# Patient Record
Sex: MALE | Race: WHITE | Employment: OTHER | URBAN - METROPOLITAN AREA
[De-identification: names, ages, dates, MRNs, and addresses within clinical notes are randomized per-mention and may not be internally consistent; named-entity substitution may affect disease eponyms.]

---

## 2017-06-30 ENCOUNTER — OFFICE VISIT (OUTPATIENT)
Dept: FAMILY MEDICINE | Facility: CLINIC | Age: 62
End: 2017-06-30
Payer: COMMERCIAL

## 2017-06-30 ENCOUNTER — TELEPHONE (OUTPATIENT)
Dept: FAMILY MEDICINE | Facility: CLINIC | Age: 62
End: 2017-06-30

## 2017-06-30 VITALS
BODY MASS INDEX: 32.91 KG/M2 | TEMPERATURE: 98.4 F | HEIGHT: 72 IN | SYSTOLIC BLOOD PRESSURE: 134 MMHG | HEART RATE: 66 BPM | OXYGEN SATURATION: 98 % | RESPIRATION RATE: 18 BRPM | DIASTOLIC BLOOD PRESSURE: 76 MMHG | WEIGHT: 243 LBS

## 2017-06-30 DIAGNOSIS — E11.9 TYPE 2 DIABETES MELLITUS WITHOUT COMPLICATION, WITHOUT LONG-TERM CURRENT USE OF INSULIN (H): Primary | ICD-10-CM

## 2017-06-30 DIAGNOSIS — E66.9 OBESITY (BMI 30-39.9): ICD-10-CM

## 2017-06-30 DIAGNOSIS — I10 ESSENTIAL HYPERTENSION WITH GOAL BLOOD PRESSURE LESS THAN 140/90: ICD-10-CM

## 2017-06-30 DIAGNOSIS — Z13.5 SCREENING FOR DIABETIC RETINOPATHY: ICD-10-CM

## 2017-06-30 DIAGNOSIS — G47.33 OSA (OBSTRUCTIVE SLEEP APNEA): ICD-10-CM

## 2017-06-30 DIAGNOSIS — Z13.6 CARDIOVASCULAR SCREENING; LDL GOAL LESS THAN 70: ICD-10-CM

## 2017-06-30 DIAGNOSIS — Z13.89 SCREENING FOR DIABETIC PERIPHERAL NEUROPATHY: ICD-10-CM

## 2017-06-30 PROCEDURE — 99214 OFFICE O/P EST MOD 30 MIN: CPT | Performed by: FAMILY MEDICINE

## 2017-06-30 PROCEDURE — 99207 C FOOT EXAM  NO CHARGE: CPT | Performed by: FAMILY MEDICINE

## 2017-06-30 RX ORDER — LANCETS
EACH MISCELLANEOUS
Qty: 200 EACH | Refills: 1 | Status: SHIPPED | OUTPATIENT
Start: 2017-06-30 | End: 2021-04-02

## 2017-06-30 RX ORDER — LISINOPRIL 40 MG/1
40 TABLET ORAL DAILY
Qty: 90 TABLET | Refills: 3 | Status: SHIPPED | OUTPATIENT
Start: 2017-06-30 | End: 2018-06-28

## 2017-06-30 RX ORDER — METOPROLOL SUCCINATE 200 MG/1
200 TABLET, EXTENDED RELEASE ORAL DAILY
Qty: 90 TABLET | Refills: 3 | Status: SHIPPED | OUTPATIENT
Start: 2017-06-30 | End: 2018-06-28

## 2017-06-30 RX ORDER — HYDROCHLOROTHIAZIDE 25 MG/1
25 TABLET ORAL EVERY MORNING
Qty: 90 TABLET | Refills: 3 | Status: SHIPPED | OUTPATIENT
Start: 2017-06-30 | End: 2018-06-28

## 2017-06-30 ASSESSMENT — PAIN SCALES - GENERAL: PAINLEVEL: NO PAIN (0)

## 2017-06-30 NOTE — TELEPHONE ENCOUNTER
Breeze 2 test strips not on formulary, can dr change to onetouch ultra blue meter, strips and lancets that are covered on patients insurance? Please advise, thank you.    Keesha Freedman, Pharmacy Technician  [facility]  [phone]

## 2017-06-30 NOTE — PROGRESS NOTES
SUBJECTIVE:                                                    Barbie Portillo is a 61 year old male who presents to clinic today for the following health issues:      Medication Followup / refills     Taking Medication as prescribed: yes    Side Effects:  None    Medication Helping Symptoms:  yes       Problem list and histories reviewed & adjusted, as indicated.  Additional history: as documented    Reviewed and updated as needed this visit by clinical staff  Tobacco  Allergies  Meds  Problems  Med Hx  Surg Hx  Fam Hx  Soc Hx        Reviewed and updated as needed this visit by Provider  Allergies  Meds  Problems         Patient here for medication recheck today.  Diabetes has been well controlled.  He checks his blood sugars a few times a week and it is typically in the 140 range or lower.  His hemoglobin A1c has historically been in low 6 range and he does not require medication for it.      Blood pressure has been at goal.  His is on hydrochlorothiazide and lisinopril and metoprolol for this.     Has obstructive sleep apnea and uses CPAP.  This is well controlled and followed by our sleep center.    Had normal eye exam last fall.    ROS:  10 point ROS of systems including Constitutional, Eyes, Respiratory, Cardiovascular, Gastroenterology, Genitourinary, Integumentary, Muscularskeletal, Psychiatric were all negative except for pertinent positives noted in my HPI.     OBJECTIVE:                                                    /76  Pulse 66  Temp 98.4  F (36.9  C) (Temporal)  Resp 18  Ht 6' (1.829 m)  Wt 243 lb (110.2 kg)  SpO2 98%  BMI 32.96 kg/m2  Body mass index is 32.96 kg/(m^2).  GENERAL APPEARANCE: alert, no distress and obese  RESP: lungs clear to auscultation - no rales, rhonchi or wheezes  CV: regular rates and rhythm, normal S1 S2, no S3 or S4 and no murmur, click or rub  MS: extremities normal- no gross deformities noted.  No lower extremity edema.  DIABETIC FOOT EXAM:  normal DP and PT pulses, no trophic changes or ulcerative lesions and normal sensory exam          ASSESSMENT/PLAN:                                                        ICD-10-CM    1. Type 2 diabetes mellitus without complication, without long-term current use of insulin (H) E11.9 Basic metabolic panel - FUTURE  S+90     Hemoglobin A1c - FUTURE S+90     Lipid panel reflex to direct LDL - FUTURE  S+90     Albumin Random Urine Quantitative - FUTURE  S+90     TSH with free T4 reflex - FUTURE  S+90     STATIN NOT PRESCRIBED, INTENTIONAL,     blood glucose (CHIKI BREEZE 2) test strip   2. Essential hypertension with goal blood pressure less than 140/90 I10 hydrochlorothiazide (HYDRODIURIL) 25 MG tablet     lisinopril (PRINIVIL/ZESTRIL) 40 MG tablet     metoprolol (TOPROL-XL) 200 MG 24 hr tablet     Basic metabolic panel - FUTURE  S+90     Lipid panel reflex to direct LDL - FUTURE  S+90     Albumin Random Urine Quantitative - FUTURE  S+90     TSH with free T4 reflex - FUTURE  S+90   3. Screening for diabetic retinopathy Z13.5 EYE EXAM (SIMPLE-NONBILLABLE)     4. Screening for diabetic peripheral neuropathy Z13.89 FOOT EXAM  NO CHARGE [37679.114]   5. Obesity (BMI 30-39.9) E66.9 TSH with free T4 reflex - FUTURE  S+90   6. SEVERE NASRIN (obstructive sleep apnea) G47.33    7. CARDIOVASCULAR SCREENING; LDL GOAL LESS THAN 70 Z13.6 Lipid panel reflex to direct LDL - FUTURE  S+90     PLAN:  1.   Medications refilled for all other above noted stable conditions.   2.  Monitoring labs as noted above.  3.  Continue management of obstructive sleep apnea.   4.  Weight loss encouraged.  Exercise discussed.    Follow up with Provider - 6-12 months for hypertension and diabetes monitoring.  Sooner if labs out of range.     Aric Stephens MD   [facility]

## 2017-06-30 NOTE — MR AVS SNAPSHOT
After Visit Summary   6/30/2017    Barbie Portillo    MRN: [id number]           Patient Information     Date Of Birth          1955        Visit Information        Provider Department      6/30/2017 2:45 PM Aric Stephens MD [facility]        Today's Diagnoses     Type 2 diabetes mellitus without complication, without long-term current use of insulin (H)    -  1    Essential hypertension with goal blood pressure less than 140/90        Screening for diabetic retinopathy        Screening for diabetic peripheral neuropathy        Obesity (BMI 30-39.9)        SEVERE NASRIN (obstructive sleep apnea)        CARDIOVASCULAR SCREENING; LDL GOAL LESS THAN 70           Follow-ups after your visit        Future tests that were ordered for you today     Open Future Orders        Priority Expected Expires Ordered    Basic metabolic panel - FUTURE  S+90 Routine  9/28/2017 6/30/2017    Hemoglobin A1c - FUTURE S+90 Routine  9/28/2017 6/30/2017    Lipid panel reflex to direct LDL - FUTURE  S+90 Routine  9/28/2017 6/30/2017    Albumin Random Urine Quantitative - FUTURE  S+90 Routine  9/28/2017 6/30/2017    TSH with free T4 reflex - FUTURE  S+90 Routine  9/28/2017 6/30/2017            Who to contact     If you have questions or need follow up information about today's clinic visit or your schedule please contact [facility] directly at [phone].  Normal or non-critical lab and imaging results will be communicated to you by MyChart, letter or phone within 4 business days after the clinic has received the results. If you do not hear from us within 7 days, please contact the clinic through MyChart or phone. If you have a critical or abnormal lab result, we will notify you by phone as soon as possible.  Submit refill requests through [employer] or call your pharmacy and they will forward the refill request to us. Please allow 3 business days for your refill to be completed.        "   Additional Information About Your Visit        MyChart Information     [employer] lets you send messages to your doctor, view your test results, renew your prescriptions, schedule appointments and more. To sign up, go to [email or web address] . Click on \"Log in\" on the left side of the screen, which will take you to the Welcome page. Then click on \"Sign up Now\" on the right side of the page.     You will be asked to enter the access code listed below, as well as some personal information. Please follow the directions to create your username and password.     Your access code is: [email or web address]  Expires: 2017  3:45 PM     Your access code will  in 90 days. If you need help or a new code, please call your [facility] clinic or [phone].        Care EveryWhere ID     This is your Care EveryWhere ID. This could be used by other organizations to access your [facility] medical records  [id number]        Your Vitals Were     Pulse Temperature Respirations Height Pulse Oximetry BMI (Body Mass Index)    66 98.4  F (36.9  C) (Temporal) 18 6' (1.829 m) 98% 32.96 kg/m2       Blood Pressure from Last 3 Encounters:   17 134/76   16 132/80   10/13/15 124/72    Weight from Last 3 Encounters:   17 243 lb (110.2 kg)   16 241 lb (109.3 kg)   10/13/15 247 lb (112 kg)              We Performed the Following     EYE EXAM (SIMPLE-NONBILLABLE)       FOOT EXAM  NO CHARGE [22862.114]          Where to get your medicines      These medications were sent to [facility] [place]  [place], [place] - [address]  [address], [place] [address]     Phone:  [phone]     blood glucose test strip    hydrochlorothiazide 25 MG tablet    lisinopril 40 MG tablet    metoprolol 200 MG 24 hr tablet         Some of these will need a paper prescription and others can be bought over the counter.  Ask your nurse if you have questions.     You don't need a prescription for these medications     STATIN NOT " PRESCRIBED (INTENTIONAL)          Primary Care Provider Office Phone # Fax #    Aric Stephens [phone]       [facility] [place] [address]   [facility] [address]        Equal Access to Services     ALIA ROBLES : Hadyohan keen ku hadcamachoo Soomaali, waaxda luqadaha, qaybta kaalmada adeegyada, waxdonita gamezn drake koch laummventura tay. So [facility] [phone].    ATENCIÓN: Si habla español, tiene a hernandez disposición servicios gratuitos de asistencia lingüística. Ll[facility] [phone].    We comply with applicable federal civil rights laws and [place] laws. We do not discriminate on the basis of race, color, national origin, age, disability sex, sexual orientation or gender identity.            Thank you!     Thank you for choosing [facility]  for your care. Our goal is always to provide you with excellent care. Hearing back from our patients is one way we can continue to improve our services. Please take a few minutes to complete the written survey that you may receive in the mail after your visit with us. Thank you!             Your Updated Medication List - Protect others around you: Learn how to safely use, store and throw away your medicines at www.disposemymeds.org.          This list is accurate as of: 6/30/17  3:45 PM.  Always use your most recent med list.                   Brand Name Dispense Instructions for use Diagnosis    aspirin 81 MG tablet     30 tablet    Take 1 tablet (81 mg) by mouth daily    Type 2 diabetes, HbA1c goal < 7% (H), Hypertension goal BP (blood pressure) < 140/90       blood glucose monitoring lancets     1 Box    Use to test blood sugar 3 times daily or as directed.    Type II or unspecified type diabetes mellitus without mention of complication, not stated as uncontrolled       blood glucose test strip    CHIKI BREEZE 2    100 each    Use to test blood sugar 3 times daily or as directed.    Type 2 diabetes mellitus without complication,  without long-term current use of insulin (H)       DAILY MULTIVITAMIN PO      Take 1 tablet by mouth daily.        hydrochlorothiazide 25 MG tablet    HYDRODIURIL    90 tablet    Take 1 tablet (25 mg) by mouth every morning    Essential hypertension with goal blood pressure less than 140/90       lisinopril 40 MG tablet    PRINIVIL/ZESTRIL    90 tablet    Take 1 tablet (40 mg) by mouth daily    Essential hypertension with goal blood pressure less than 140/90       metoprolol 200 MG 24 hr tablet    TOPROL-XL    90 tablet    Take 1 tablet (200 mg) by mouth daily    Essential hypertension with goal blood pressure less than 140/90       STATIN NOT PRESCRIBED (INTENTIONAL)      Statin not prescribed intentionally due to Other: not indicated due to extremely low LDL.    Type 2 diabetes mellitus without complication, without long-term current use of insulin (H)

## 2017-06-30 NOTE — TELEPHONE ENCOUNTER
Sent generic lancets and test strips so pharmacy can fill whatever insurance will cover.  Closing this encounter.  Aym Gonsalves RN

## 2017-06-30 NOTE — NURSING NOTE
Chief Complaint   Patient presents with     Recheck Medication     refills        Initial /76  Pulse 66  Temp 98.4  F (36.9  C) (Temporal)  Resp 18  Ht 6' (1.829 m)  Wt 243 lb (110.2 kg)  SpO2 98%  BMI 32.96 kg/m2 Estimated body mass index is 32.96 kg/(m^2) as calculated from the following:    Height as of this encounter: 6' (1.829 m).    Weight as of this encounter: 243 lb (110.2 kg).  Medication Reconciliation: complete

## 2017-07-08 DIAGNOSIS — Z13.6 CARDIOVASCULAR SCREENING; LDL GOAL LESS THAN 70: ICD-10-CM

## 2017-07-08 DIAGNOSIS — I10 ESSENTIAL HYPERTENSION WITH GOAL BLOOD PRESSURE LESS THAN 140/90: ICD-10-CM

## 2017-07-08 DIAGNOSIS — E66.9 OBESITY (BMI 30-39.9): ICD-10-CM

## 2017-07-08 DIAGNOSIS — E11.9 TYPE 2 DIABETES MELLITUS WITHOUT COMPLICATION, WITHOUT LONG-TERM CURRENT USE OF INSULIN (H): ICD-10-CM

## 2017-07-08 LAB
ANION GAP SERPL CALCULATED.3IONS-SCNC: 9 MMOL/L (ref 3–14)
BUN SERPL-MCNC: 12 MG/DL (ref 7–30)
CALCIUM SERPL-MCNC: 8.4 MG/DL (ref 8.5–10.1)
CHLORIDE SERPL-SCNC: 109 MMOL/L (ref 94–109)
CHOLEST SERPL-MCNC: 119 MG/DL
CO2 SERPL-SCNC: 27 MMOL/L (ref 20–32)
CREAT SERPL-MCNC: 0.76 MG/DL (ref 0.66–1.25)
CREAT UR-MCNC: 283 MG/DL
GFR SERPL CREATININE-BSD FRML MDRD: ABNORMAL ML/MIN/1.7M2
GLUCOSE SERPL-MCNC: 112 MG/DL (ref 70–99)
HBA1C MFR BLD: 6 % (ref 4.3–6)
HDLC SERPL-MCNC: 47 MG/DL
LDLC SERPL CALC-MCNC: 47 MG/DL
MICROALBUMIN UR-MCNC: 28 MG/L
MICROALBUMIN/CREAT UR: 9.89 MG/G CR (ref 0–17)
NONHDLC SERPL-MCNC: 72 MG/DL
POTASSIUM SERPL-SCNC: 3.7 MMOL/L (ref 3.4–5.3)
SODIUM SERPL-SCNC: 145 MMOL/L (ref 133–144)
TRIGL SERPL-MCNC: 126 MG/DL
TSH SERPL DL<=0.005 MIU/L-ACNC: 1.38 MU/L (ref 0.4–4)

## 2017-07-08 PROCEDURE — 84443 ASSAY THYROID STIM HORMONE: CPT | Performed by: FAMILY MEDICINE

## 2017-07-08 PROCEDURE — 82043 UR ALBUMIN QUANTITATIVE: CPT | Performed by: FAMILY MEDICINE

## 2017-07-08 PROCEDURE — 36415 COLL VENOUS BLD VENIPUNCTURE: CPT | Performed by: FAMILY MEDICINE

## 2017-07-08 PROCEDURE — 80061 LIPID PANEL: CPT | Performed by: FAMILY MEDICINE

## 2017-07-08 PROCEDURE — 80048 BASIC METABOLIC PNL TOTAL CA: CPT | Performed by: FAMILY MEDICINE

## 2017-07-08 PROCEDURE — 83036 HEMOGLOBIN GLYCOSYLATED A1C: CPT | Mod: QW | Performed by: FAMILY MEDICINE

## 2018-06-28 ENCOUNTER — OFFICE VISIT (OUTPATIENT)
Dept: FAMILY MEDICINE | Facility: CLINIC | Age: 63
End: 2018-06-28
Payer: COMMERCIAL

## 2018-06-28 VITALS
DIASTOLIC BLOOD PRESSURE: 74 MMHG | SYSTOLIC BLOOD PRESSURE: 136 MMHG | HEART RATE: 71 BPM | WEIGHT: 255.2 LBS | RESPIRATION RATE: 16 BRPM | BODY MASS INDEX: 34.57 KG/M2 | OXYGEN SATURATION: 95 % | TEMPERATURE: 98.5 F | HEIGHT: 72 IN

## 2018-06-28 DIAGNOSIS — Z12.5 SCREENING PSA (PROSTATE SPECIFIC ANTIGEN): ICD-10-CM

## 2018-06-28 DIAGNOSIS — Z12.11 SCREEN FOR COLON CANCER: ICD-10-CM

## 2018-06-28 DIAGNOSIS — Z23 NEED FOR PROPHYLACTIC VACCINATION WITH TETANUS-DIPHTHERIA (TD): ICD-10-CM

## 2018-06-28 DIAGNOSIS — G47.33 OSA (OBSTRUCTIVE SLEEP APNEA): ICD-10-CM

## 2018-06-28 DIAGNOSIS — E78.5 HYPERLIPIDEMIA LDL GOAL <70: ICD-10-CM

## 2018-06-28 DIAGNOSIS — L30.9 DERMATITIS: ICD-10-CM

## 2018-06-28 DIAGNOSIS — E11.9 TYPE 2 DIABETES MELLITUS WITHOUT COMPLICATION, WITHOUT LONG-TERM CURRENT USE OF INSULIN (H): Primary | ICD-10-CM

## 2018-06-28 DIAGNOSIS — I10 ESSENTIAL HYPERTENSION WITH GOAL BLOOD PRESSURE LESS THAN 140/90: ICD-10-CM

## 2018-06-28 DIAGNOSIS — Z23 ENCOUNTER FOR IMMUNIZATION: ICD-10-CM

## 2018-06-28 DIAGNOSIS — E66.9 OBESITY (BMI 30-39.9): ICD-10-CM

## 2018-06-28 DIAGNOSIS — Z13.89 SCREENING FOR DIABETIC PERIPHERAL NEUROPATHY: ICD-10-CM

## 2018-06-28 PROCEDURE — 99207 C FOOT EXAM  NO CHARGE: CPT | Performed by: FAMILY MEDICINE

## 2018-06-28 PROCEDURE — 90715 TDAP VACCINE 7 YRS/> IM: CPT | Performed by: FAMILY MEDICINE

## 2018-06-28 PROCEDURE — 90471 IMMUNIZATION ADMIN: CPT | Performed by: FAMILY MEDICINE

## 2018-06-28 PROCEDURE — 90472 IMMUNIZATION ADMIN EACH ADD: CPT | Performed by: FAMILY MEDICINE

## 2018-06-28 PROCEDURE — 99214 OFFICE O/P EST MOD 30 MIN: CPT | Mod: 25 | Performed by: FAMILY MEDICINE

## 2018-06-28 PROCEDURE — 90750 HZV VACC RECOMBINANT IM: CPT | Performed by: FAMILY MEDICINE

## 2018-06-28 RX ORDER — METOPROLOL SUCCINATE 200 MG/1
200 TABLET, EXTENDED RELEASE ORAL DAILY
Qty: 90 TABLET | Refills: 3 | Status: SHIPPED | OUTPATIENT
Start: 2018-06-28 | End: 2019-07-01

## 2018-06-28 RX ORDER — LISINOPRIL 40 MG/1
40 TABLET ORAL DAILY
Qty: 90 TABLET | Refills: 3 | Status: SHIPPED | OUTPATIENT
Start: 2018-06-28 | End: 2019-07-01

## 2018-06-28 RX ORDER — HYDROCHLOROTHIAZIDE 25 MG/1
25 TABLET ORAL EVERY MORNING
Qty: 90 TABLET | Refills: 3 | Status: SHIPPED | OUTPATIENT
Start: 2018-06-28 | End: 2019-07-01

## 2018-06-28 ASSESSMENT — PAIN SCALES - GENERAL: PAINLEVEL: NO PAIN (0)

## 2018-06-28 NOTE — MR AVS SNAPSHOT
After Visit Summary   6/28/2018    Barbie Portillo    MRN: [id number]           Patient Information     Date Of Birth          1955        Visit Information        Provider Department      6/28/2018 1:30 PM Aric Stephens MD [facility]        Today's Diagnoses     Type 2 diabetes mellitus without complication, without long-term current use of insulin (H)    -  1    Essential hypertension with goal blood pressure less than 140/90        Dermatitis        Screen for colon cancer        Screening for diabetic peripheral neuropathy        Need for prophylactic vaccination with tetanus-diphtheria (TD)        SEVERE NASRIN (obstructive sleep apnea)        Hyperlipidemia LDL goal <70        Obesity (BMI 30-39.9)        Screening PSA (prostate specific antigen)          Care Instructions    Try 1% hydrocortisone cream twice daily for a couple of weeks.  If this is not effective after 1-2 weeks or if at any points it worsens, start using Lotrimin (clotrimazole) over the counter antifungal cream twice daily.      Use both creams together if the 1% hydrocortisone cream is partially effective but not fully effective.      You may also try the clotrimazole cream first.            Follow-ups after your visit        Additional Services     GASTROENTEROLOGY ADULT REF PROCEDURE ONLY [facility] [phone]; [facility] Provider       Last Lab Result: Creatinine (mg/dL)       Date                     Value                 07/08/2017               0.76             ----------  Body mass index is 34.61 kg/(m^2).      Patient will be contacted to schedule procedure.     Please be aware that coverage of these services is subject to the terms and limitations of your health insurance plan.  Call member services at your health plan with any benefit or coverage questions.  Any procedures must be performed at a [facility] facility OR coordinated by your clinic's referral office.    Please bring the  following with you to your appointment:    (1) Any X-Rays, CTs or MRIs which have been performed.  Contact the facility where they were done to arrange for  prior to your scheduled appointment.    (2) List of current medications   (3) This referral request   (4) Any documents/labs given to you for this referral                  Future tests that were ordered for you today     Open Future Orders        Priority Expected Expires Ordered    PSA, screen Routine  12/28/2018 6/28/2018    FOOT EXAM  NO CHARGE [41459.114] Routine  12/28/2018 6/28/2018    Hemoglobin A1c - FUTURE S+90 Routine  12/28/2018 6/28/2018    Lipid panel reflex to direct LDL Fasting Routine  12/28/2018 6/28/2018    Albumin Random Urine Quantitative with Creat Ratio Routine  12/28/2018 6/28/2018    Basic metabolic panel Routine  12/28/2018 6/28/2018            Who to contact     If you have questions or need follow up information about today's clinic visit or your schedule please contact [facility] directly at [phone].  Normal or non-critical lab and imaging results will be communicated to you by MyChart, letter or phone within 4 business days after the clinic has received the results. If you do not hear from us within 7 days, please contact the clinic through MyChart or phone. If you have a critical or abnormal lab result, we will notify you by phone as soon as possible.  Submit refill requests through [employer] or call your pharmacy and they will forward the refill request to us. Please allow 3 business days for your refill to be completed.          Additional Information About Your Visit        Care EveryWhere ID     This is your Care EveryWhere ID. This could be used by other organizations to access your [facility] medical records  [id number]        Your Vitals Were     Pulse Temperature Respirations Pulse Oximetry BMI (Body Mass Index)       71 98.5  F (36.9  C) (Temporal) 16 95% 34.61 kg/m2        Blood Pressure from  Last 3 Encounters:   06/28/18 150/80   06/30/17 134/76   06/24/16 132/80    Weight from Last 3 Encounters:   06/28/18 255 lb 3.2 oz (115.8 kg)   06/30/17 243 lb (110.2 kg)   06/24/16 241 lb (109.3 kg)              We Performed the Following     GASTROENTEROLOGY ADULT REF PROCEDURE ONLY [facility] [phone]; [facility] Provider          Where to get your medicines      These medications were sent to [facility], [place] - [address] [place] [address]     Phone:  [phone]     blood glucose monitoring lancets    blood glucose test strip    hydrochlorothiazide 25 MG tablet    lisinopril 40 MG tablet    metoprolol succinate 200 MG 24 hr tablet          Primary Care Provider Office Phone # Fax #    Aric Dylan Stephens [phone]       [address]   [place] [place] [address]        Equal Access to Services     ARTUR ROBLES AH: Hadii aad ku hadasho Soomaali, waaxda luqadaha, qaybta kaalmada adeegyada, waxay idiin hayaan drake carrion . So [facility] [phone].    ATENCIÓN: Si habla español, tiene a hernandez disposición servicios gratuitos de asistencia lingüística. Llame al [phone].    We comply with applicable federal civil rights laws and [place] laws. We do not discriminate on the basis of race, color, national origin, age, disability, sex, sexual orientation, or gender identity.            Thank you!     Thank you for choosing [facility]  for your care. Our goal is always to provide you with excellent care. Hearing back from our patients is one way we can continue to improve our services. Please take a few minutes to complete the written survey that you may receive in the mail after your visit with us. Thank you!             Your Updated Medication List - Protect others around you: Learn how to safely use, store and throw away your medicines at www.disposemymeds.org.          This list is accurate as of 6/28/18  2:43 PM.   Always use your most recent med list.                   Brand Name Dispense Instructions for use Diagnosis    aspirin 81 MG tablet     30 tablet    Take 1 tablet (81 mg) by mouth daily    Type 2 diabetes, HbA1c goal < 7% (H), Hypertension goal BP (blood pressure) < 140/90       * blood glucose monitoring test strip    no brand specified    100 strip    Use to test blood sugar 3 times daily or as directed. To accompany: Blood Glucose Monitor Brands: per insurance.    Type 2 diabetes mellitus without complication, without long-term current use of insulin (H)       * blood glucose test strip    LiveyearbookEZE 2    100 each    Use to test blood sugar 3 times daily or as directed.    Type 2 diabetes mellitus without complication, without long-term current use of insulin (H)       DAILY MULTIVITAMIN PO      Take 1 tablet by mouth daily.        hydrochlorothiazide 25 MG tablet    HYDRODIURIL    90 tablet    Take 1 tablet (25 mg) by mouth every morning    Essential hypertension with goal blood pressure less than 140/90       lisinopril 40 MG tablet    PRINIVIL/ZESTRIL    90 tablet    Take 1 tablet (40 mg) by mouth daily    Essential hypertension with goal blood pressure less than 140/90       metoprolol succinate 200 MG 24 hr tablet    TOPROL-XL    90 tablet    Take 1 tablet (200 mg) by mouth daily    Essential hypertension with goal blood pressure less than 140/90       PREBIOTIC PRODUCT PO           PROBIOTIC ACIDOPHILUS PO           STATIN NOT PRESCRIBED (INTENTIONAL)      Statin not prescribed intentionally due to Other: not indicated due to extremely low LDL.    Type 2 diabetes mellitus without complication, without long-term current use of insulin (H)       * thin lancets    NO BRAND SPECIFIED    200 each    Use to test blood sugar 3 times daily or as directed. To accompany: Blood Glucose Monitor Brands: per insurance.    Type 2 diabetes mellitus without complication, without long-term current use of insulin (H)       *  blood glucose monitoring lancets     1 each    Use to test blood sugar 3 times daily or as directed.    Type 2 diabetes mellitus without complication, without long-term current use of insulin (H)       * Notice:  This list has 4 medication(s) that are the same as other medications prescribed for you. Read the directions carefully, and ask your doctor or other care provider to review them with you.

## 2018-06-28 NOTE — PROGRESS NOTES
SUBJECTIVE:   Barbie Portillo is a 62 year old male who presents to clinic today for the following health issues:      Diabetes Follow-up    Patient is checking blood sugars: once daily.  Results are as follows:         am - 100-130    Diabetic concerns: None     Symptoms of hypoglycemia (low blood sugar): none     Paresthesias (numbness or burning in feet) or sores: No     Date of last diabetic eye exam: 4/2018    BP Readings from Last 2 Encounters:   06/28/18 136/74   06/30/17 134/76     Hemoglobin A1C (%)   Date Value   07/08/2017 6.0   06/27/2016 6.1 (H)     LDL Cholesterol Calculated (mg/dL)   Date Value   07/08/2017 47   06/27/2016 49       Diabetes Management Resources  Hypertension Follow-up      Outpatient blood pressures are not being checked.    Low Salt Diet: low salt      Amount of exercise or physical activity: None    Problems taking medications regularly: No    Medication side effects: none    Diet: low salt          Problem list and histories reviewed & adjusted, as indicated.  Additional history: as documented      Reviewed and updated as needed this visit by clinical staff  Tobacco  Allergies  Meds  Problems  Med Hx  Surg Hx  Fam Hx  Soc Hx        Reviewed and updated as needed this visit by Provider  Allergies  Meds  Problems         Patient here for follow-up of his diabetes and hypertension.  These are stable and he is not having any medication issues.  He checks his blood sugars as noted above.  Patient does not require statin medication due to his extremely low LDL.    Patient has a issue with his left foot and ankle which he is seeing a outside podiatrist.  He says that surgery is very likely but he does not know when in the next couple months this will occur.    Patient is due for routine colonoscopy.    Patient also would like evaluation of a bilateral axillary rash.  It is mildly pruritic.    Patient has obstructive sleep apnea that is being managed by the sleep  clinic    ROS:  10 point ROS of systems including Constitutional, Eyes, HENT, Respiratory, Cardiovascular, Gastroenterology, Genitourinary, Integumentary, Muscularskeletal, Psychiatric were all negative except for pertinent positives noted in my HPI.     OBJECTIVE:   /74  Pulse 71  Temp 98.5  F (36.9  C) (Temporal)  Resp 16  Ht 6' (1.829 m)  Wt 255 lb 3.2 oz (115.8 kg)  SpO2 95%  BMI 34.61 kg/m2  Body mass index is 34.61 kg/(m^2).  Physical Exam   Constitutional: He appears well-developed and well-nourished.   Cardiovascular: Normal rate, regular rhythm, S1 normal, S2 normal and normal heart sounds.    No murmur heard.  Pulmonary/Chest: Effort normal and breath sounds normal. No respiratory distress. He has no wheezes. He has no rhonchi. He has no rales.   Musculoskeletal: He exhibits no edema.   FEET:  monofilament exam normal bilaterally.  Good hair growth.  Dorsalis pedis pulses 2+ bilaterally.  Feet warm.    Feet:   Right Foot:   Skin Integrity: Negative for ulcer, blister, skin breakdown or erythema.   Left Foot:   Skin Integrity: Negative for ulcer, blister, skin breakdown or erythema.   Neurological: He is alert.   Skin:   Bilateral axillary areas have well-demarcated erythematous macules that are uniform in color without a raised border.       ASSESSMENT/PLAN:       ICD-10-CM    1. Type 2 diabetes mellitus without complication, without long-term current use of insulin (H) E11.9 Hemoglobin A1c - FUTURE S+90     Lipid panel reflex to direct LDL Fasting     Albumin Random Urine Quantitative with Creat Ratio     blood glucose (CHIKI BREEZE 2) test strip     blood glucose monitoring (CHIKI MICROLET) lancets     Basic metabolic panel   2. Essential hypertension with goal blood pressure less than 140/90 I10 hydrochlorothiazide (HYDRODIURIL) 25 MG tablet     lisinopril (PRINIVIL/ZESTRIL) 40 MG tablet     metoprolol succinate (TOPROL-XL) 200 MG 24 hr tablet   3. Dermatitis L30.9    4. Screen for colon  cancer Z12.11 GASTROENTEROLOGY ADULT REF PROCEDURE ONLY [facility] [phone]; [place] Provider   5. Screening for diabetic peripheral neuropathy Z13.89 FOOT EXAM  NO CHARGE [[id number]]   6. Need for prophylactic vaccination with tetanus-diphtheria (TD) Z23    7. SEVERE NASRIN (obstructive sleep apnea) G47.33    8. Hyperlipidemia LDL goal <70 E78.5    9. Obesity (BMI 30-39.9) E66.9    10. Screening PSA (prostate specific antigen) Z12.5 PSA, screen   11. Encounter for immunization Z23 ZOSTER VACCINE RECOMBINANT ADJUVANTED IM NJX     ADMIN 1st VACCINE     TDAP, IM (10 - 64 YRS) - Adacel     EA ADD'L VACCINE     PLAN:  1.    See below for over the counter medication recommendations regarding management of the axillary rash.  Rash is either fungal in origin or a dermatitis secondary to sweat and/or antiperspirant/deodorant use.  2.  Medications refilled for all other above noted stable conditions.  Labs ordered as noted above.     Patient Instructions   Try 1% hydrocortisone cream twice daily for a couple of weeks.  If this is not effective after 1-2 weeks or if at any points it worsens, start using Lotrimin (clotrimazole) over the counter antifungal cream twice daily.      Use both creams together if the 1% hydrocortisone cream is partially effective but not fully effective.      You may also try the clotrimazole cream first.           Follow up with Provider -6-12 months for diabetes recheck.    Aric Stephens MD   [facility]

## 2018-06-29 ENCOUNTER — TELEPHONE (OUTPATIENT)
Dept: FAMILY MEDICINE | Facility: CLINIC | Age: 63
End: 2018-06-29

## 2018-06-29 NOTE — TELEPHONE ENCOUNTER
Called to schedule colonoscopy, patient states he is not ready to schedule will call when he is ready

## 2018-06-30 DIAGNOSIS — Z12.5 SCREENING PSA (PROSTATE SPECIFIC ANTIGEN): ICD-10-CM

## 2018-06-30 DIAGNOSIS — E11.9 TYPE 2 DIABETES MELLITUS WITHOUT COMPLICATION, WITHOUT LONG-TERM CURRENT USE OF INSULIN (H): ICD-10-CM

## 2018-06-30 LAB
ANION GAP SERPL CALCULATED.3IONS-SCNC: 9 MMOL/L (ref 3–14)
BUN SERPL-MCNC: 12 MG/DL (ref 7–30)
CALCIUM SERPL-MCNC: 8.4 MG/DL (ref 8.5–10.1)
CHLORIDE SERPL-SCNC: 109 MMOL/L (ref 94–109)
CHOLEST SERPL-MCNC: 106 MG/DL
CO2 SERPL-SCNC: 25 MMOL/L (ref 20–32)
CREAT SERPL-MCNC: 0.79 MG/DL (ref 0.66–1.25)
CREAT UR-MCNC: 142 MG/DL
GFR SERPL CREATININE-BSD FRML MDRD: >90 ML/MIN/1.7M2
GLUCOSE SERPL-MCNC: 121 MG/DL (ref 70–99)
HBA1C MFR BLD: 6.2 % (ref 0–5.6)
HDLC SERPL-MCNC: 42 MG/DL
LDLC SERPL CALC-MCNC: 34 MG/DL
MICROALBUMIN UR-MCNC: 13 MG/L
MICROALBUMIN/CREAT UR: 8.87 MG/G CR (ref 0–17)
NONHDLC SERPL-MCNC: 64 MG/DL
POTASSIUM SERPL-SCNC: 3.8 MMOL/L (ref 3.4–5.3)
PSA SERPL-ACNC: 0.76 UG/L (ref 0–4)
SODIUM SERPL-SCNC: 143 MMOL/L (ref 133–144)
TRIGL SERPL-MCNC: 148 MG/DL

## 2018-06-30 PROCEDURE — 83036 HEMOGLOBIN GLYCOSYLATED A1C: CPT | Mod: QW | Performed by: FAMILY MEDICINE

## 2018-06-30 PROCEDURE — 36415 COLL VENOUS BLD VENIPUNCTURE: CPT | Performed by: FAMILY MEDICINE

## 2018-06-30 PROCEDURE — 80061 LIPID PANEL: CPT | Performed by: FAMILY MEDICINE

## 2018-06-30 PROCEDURE — 82043 UR ALBUMIN QUANTITATIVE: CPT | Performed by: FAMILY MEDICINE

## 2018-06-30 PROCEDURE — 80048 BASIC METABOLIC PNL TOTAL CA: CPT | Performed by: FAMILY MEDICINE

## 2018-06-30 PROCEDURE — G0103 PSA SCREENING: HCPCS | Performed by: FAMILY MEDICINE

## 2018-07-02 ENCOUNTER — TELEPHONE (OUTPATIENT)
Dept: FAMILY MEDICINE | Facility: CLINIC | Age: 63
End: 2018-07-02

## 2018-07-02 NOTE — TELEPHONE ENCOUNTER
----- Message from Aric Stephens MD sent at 7/2/2018  7:40 AM CDT -----  Please notify patient of results.  They all look good.    Aric Stephens MD

## 2018-12-18 ENCOUNTER — OFFICE VISIT (OUTPATIENT)
Dept: FAMILY MEDICINE | Facility: CLINIC | Age: 63
End: 2018-12-18
Payer: COMMERCIAL

## 2018-12-18 VITALS
DIASTOLIC BLOOD PRESSURE: 72 MMHG | HEIGHT: 72 IN | OXYGEN SATURATION: 98 % | RESPIRATION RATE: 20 BRPM | BODY MASS INDEX: 35.76 KG/M2 | HEART RATE: 76 BPM | TEMPERATURE: 98.1 F | SYSTOLIC BLOOD PRESSURE: 134 MMHG | WEIGHT: 264 LBS

## 2018-12-18 DIAGNOSIS — M21.962 LEFT ANKLE JOINT DEFORMITY: ICD-10-CM

## 2018-12-18 DIAGNOSIS — E66.01 SEVERE OBESITY (BMI 35.0-39.9) WITH COMORBIDITY (H): ICD-10-CM

## 2018-12-18 DIAGNOSIS — Z01.818 PREOP GENERAL PHYSICAL EXAM: Primary | ICD-10-CM

## 2018-12-18 DIAGNOSIS — Z12.11 SCREEN FOR COLON CANCER: ICD-10-CM

## 2018-12-18 DIAGNOSIS — G47.33 OSA (OBSTRUCTIVE SLEEP APNEA): ICD-10-CM

## 2018-12-18 DIAGNOSIS — E11.9 TYPE 2 DIABETES MELLITUS WITHOUT COMPLICATION, WITHOUT LONG-TERM CURRENT USE OF INSULIN (H): ICD-10-CM

## 2018-12-18 DIAGNOSIS — I10 ESSENTIAL HYPERTENSION WITH GOAL BLOOD PRESSURE LESS THAN 140/90: ICD-10-CM

## 2018-12-18 DIAGNOSIS — Z23 NEED FOR VACCINATION: ICD-10-CM

## 2018-12-18 LAB
ANION GAP SERPL CALCULATED.3IONS-SCNC: 6 MMOL/L (ref 3–14)
BUN SERPL-MCNC: 14 MG/DL (ref 7–30)
CALCIUM SERPL-MCNC: 8.6 MG/DL (ref 8.5–10.1)
CHLORIDE SERPL-SCNC: 104 MMOL/L (ref 94–109)
CO2 SERPL-SCNC: 29 MMOL/L (ref 20–32)
CREAT SERPL-MCNC: 0.84 MG/DL (ref 0.66–1.25)
GFR SERPL CREATININE-BSD FRML MDRD: >90 ML/MIN/1.7M2
GLUCOSE SERPL-MCNC: 129 MG/DL (ref 70–99)
POTASSIUM SERPL-SCNC: 3.6 MMOL/L (ref 3.4–5.3)
SODIUM SERPL-SCNC: 139 MMOL/L (ref 133–144)

## 2018-12-18 PROCEDURE — 99215 OFFICE O/P EST HI 40 MIN: CPT | Mod: 25 | Performed by: FAMILY MEDICINE

## 2018-12-18 PROCEDURE — 80048 BASIC METABOLIC PNL TOTAL CA: CPT | Performed by: FAMILY MEDICINE

## 2018-12-18 PROCEDURE — 90471 IMMUNIZATION ADMIN: CPT | Performed by: FAMILY MEDICINE

## 2018-12-18 PROCEDURE — 36415 COLL VENOUS BLD VENIPUNCTURE: CPT | Performed by: FAMILY MEDICINE

## 2018-12-18 PROCEDURE — 90750 HZV VACC RECOMBINANT IM: CPT | Performed by: FAMILY MEDICINE

## 2018-12-18 PROCEDURE — 93000 ELECTROCARDIOGRAM COMPLETE: CPT | Performed by: FAMILY MEDICINE

## 2018-12-18 ASSESSMENT — PAIN SCALES - GENERAL: PAINLEVEL: SEVERE PAIN (6)

## 2018-12-18 ASSESSMENT — MIFFLIN-ST. JEOR: SCORE: 2030.5

## 2018-12-18 NOTE — PROGRESS NOTES
[address] [address]  [phone]  Dept: [phone]    PRE-OP EVALUATION:  Today's date: 2018    Barbie Portillo (: 1955) presents for pre-operative evaluation assessment as requested by Dr. Parada .  He requires evaluation and anesthesia risk assessment prior to undergoing surgery/procedure for treatment of left ankle pain.  .    Proposed Surgery/ Procedure: Left ankle rebuild   Date of Surgery/ Procedure: 2019  Time of Surgery/ Procedure: 5:30am   Hospital/Surgical Facility: [facility]   Fax number for surgical facility: [phone]  Primary Physician: Aric Stephens  Type of Anesthesia Anticipated: General    Patient has a Health Care Directive or Living Will:  NO    1. NO - Do you have a history of heart attack, stroke, stent, bypass or surgery on an artery in the head, neck, heart or legs?  2. NO - Do you ever have any pain or discomfort in your chest?  3. NO - Do you have a history of  Heart Failure?  4. NO - Are you troubled by shortness of breath when: walking on the level, up a slight hill or at night?  5. NO - Do you currently have a cold, bronchitis or other respiratory infection?  6. NO - Do you have a cough, shortness of breath or wheezing?  7. NO - Do you sometimes get pains in the calves of your legs when you walk?  8. NO - Do you or anyone in your family have previous history of blood clots?  9. NO - Do you or does anyone in your family have a serious bleeding problem such as prolonged bleeding following surgeries or cuts?  10. NO - Have you ever had problems with anemia or been told to take iron pills?  11. NO - Have you had any abnormal blood loss such as black, tarry or bloody stools, or abnormal vaginal bleeding?  12. NO - Have you ever had a blood transfusion?  13. NO - Have you or any of your relatives ever had problems with anesthesia?  14. YES  - Do you have sleep apnea, excessive snoring or daytime drowsiness? C-Pap    15. NO - Do you have any prosthetic heart valves?  16. Yes - Do you have prosthetic joints? Screws and wires in right ankle.   17. NO - Is there any chance that you may be pregnant?      HPI:     HPI related to upcoming procedure: recommended to have his left ankle repaired due to severe joint reconstruction.        See problem list for active medical problems.  Problems all longstanding and stable, except as noted/documented.  See ROS for pertinent symptoms related to these conditions.                                                                                                                                                          .    MEDICAL HISTORY:     Patient Active Problem List    Diagnosis Date Noted     Severe obesity (BMI 35.0-39.9) with comorbidity (H) 12/18/2018     Priority: Medium     Type 2 diabetes mellitus without complication, without long-term current use of insulin (H) 07/14/2015     Priority: Medium     SEVERE NASRIN (obstructive sleep apnea) 06/04/2013     Priority: Medium     [place]  5/29/2013  Severe AHI 97.5  Oxygen Andrzej 84%  CPAP 8cmH2O       Advanced directives, counseling/discussion 08/27/2012     Priority: Medium     Discussed advance care planning with patient; information given to patient to review. 8/27/2012          Essential hypertension with goal blood pressure less than 140/90 04/18/2011     Priority: Medium      Past Medical History:   Diagnosis Date     Essential hypertension, benign 2006    Hypertension, Benign     Obesity, unspecified      Old disruption of anterior cruciate ligament     ACL right knee      Other and unspecified disc disorder of unspecified region     Intervertebral disc disorders     Past Surgical History:   Procedure Laterality Date     ANKLE SURGERY  2011    right      COLONOSCOPY  10/22/2007     SURGICAL HISTORY OF -   04/04/84    Bilateral testicular biopsy     Current Outpatient Medications   Medication Sig Dispense Refill     aspirin 81 MG  tablet Take 1 tablet (81 mg) by mouth daily 30 tablet      blood glucose (CHIKI BREEZE 2) test strip Use to test blood sugar 3 times daily or as directed. 100 each 12     blood glucose monitoring (CHIKI MICROLET) lancets Use to test blood sugar 3 times daily or as directed. 1 each 11     blood glucose monitoring (NO BRAND SPECIFIED) test strip Use to test blood sugar 3 times daily or as directed. To accompany: Blood Glucose Monitor Brands: per insurance. 100 strip 6     hydrochlorothiazide (HYDRODIURIL) 25 MG tablet Take 1 tablet (25 mg) by mouth every morning 90 tablet 3     Lactobacillus (PROBIOTIC ACIDOPHILUS PO)        lisinopril (PRINIVIL/ZESTRIL) 40 MG tablet Take 1 tablet (40 mg) by mouth daily 90 tablet 3     metoprolol succinate (TOPROL-XL) 200 MG 24 hr tablet Take 1 tablet (200 mg) by mouth daily 90 tablet 3     Multiple Vitamin (DAILY MULTIVITAMIN PO) Take 1 tablet by mouth daily.       PREBIOTIC PRODUCT PO        STATIN NOT PRESCRIBED, INTENTIONAL, Statin not prescribed intentionally due to Other: not indicated due to extremely low LDL.  0     thin (NO BRAND SPECIFIED) lancets Use to test blood sugar 3 times daily or as directed. To accompany: Blood Glucose Monitor Brands: per insurance. 200 each 1     OTC products: None, except as noted above    Allergies   Allergen Reactions     No Known Drug Allergies       Latex Allergy: NO    Social History     Tobacco Use     Smoking status: Never Smoker     Smokeless tobacco: Never Used   Substance Use Topics     Alcohol use: No     History   Drug Use No       REVIEW OF SYSTEMS:   Constitutional, neuro, ENT, endocrine, pulmonary, cardiac, gastrointestinal, genitourinary, musculoskeletal, integument and psychiatric systems are negative, except as otherwise noted.    EXAM:   /72   Pulse 76   Temp 98.1  F (36.7  C) (Temporal)   Resp 20   Ht 1.829 m (6')   Wt 119.7 kg (264 lb)   SpO2 98%   BMI 35.80 kg/m      GENERAL APPEARANCE: healthy, alert and no  distress     EYES: EOMI,  PERRL     HENT: ear canals and TM's normal and nose and mouth without ulcers or lesions     NECK: no adenopathy, no asymmetry, masses, or scars and thyroid normal to palpation     RESP: lungs clear to auscultation - no rales, rhonchi or wheezes     CV: regular rates and rhythm, normal S1 S2, no S3 or S4 and no murmur, click or rub     ABDOMEN:  soft, nontender, no HSM or masses and bowel sounds normal     MS: extremities normal- no gross deformities noted, no evidence of inflammation in joints, FROM in all extremities.     SKIN: no suspicious lesions or rashes     NEURO: Normal strength and tone, sensory exam grossly normal, mentation intact and speech normal     PSYCH: mentation appears normal. and affect normal/bright     LYMPHATICS: No cervical adenopathy    DIAGNOSTICS:   EKG: appears normal, NSR, normal axis, normal intervals, no acute ST/T changes c/w ischemia, no LVH by voltage criteria, unchanged from previous tracings     Recent Labs   Lab Test 06/30/18  0756 07/08/17  0758    145*   POTASSIUM 3.8 3.7   CR 0.79 0.76   A1C 6.2* 6.0        IMPRESSION:   Reason for surgery/procedure:    Left ankle joint deformity    Diagnosis/reason for consult:   Severe obesity (BMI 35.0-39.9) with comorbidity (H)  Type 2 diabetes mellitus without complication, without long-term current use of insulin (H)  NASRIN (obstructive sleep apnea)  Essential hypertension with goal blood pressure less than 140/90      The proposed surgical procedure is considered INTERMEDIATE risk.    REVISED CARDIAC RISK INDEX  The patient has the following serious cardiovascular risks for perioperative complications such as (MI, PE, VFib and 3  AV Block):  No serious cardiac risks  INTERPRETATION: 0 risks: Class I (very low risk - 0.4% complication rate)    The patient has the following additional risks for perioperative complications:  No identified additional risks      ICD-10-CM    1. Preop general physical exam  Z01.818 Random Glucose     Hemoglobin A1c     EKG 12-lead complete w/read - Clinics     Basic metabolic panel   2. Left ankle joint deformity M21.962    3. Severe obesity (BMI 35.0-39.9) with comorbidity (H) E66.01    4. Type 2 diabetes mellitus without complication, without long-term current use of insulin (H) E11.9 Random Glucose     Hemoglobin A1c     Basic metabolic panel   5. SEVERE NASRIN (obstructive sleep apnea) G47.33    6. Essential hypertension with goal blood pressure less than 140/90 I10        RECOMMENDATIONS:       Obstructive Sleep Apnea (or suspected sleep apnea)  Patient is clearly advised to use their home CPAP when released from surgery      --Patient is to take all scheduled medications on the day of surgery EXCEPT for modifications listed below.    Anticoagulant or Antiplatelet Medication Use  ASPIRIN: Discontinue ASA 7-10 days prior to procedure to reduce bleeding risk.  It should be resumed post-operatively.        ACE Inhibitor or Angiotensin Receptor Blocker (ARB) Use  Ace inhibitor or Angiotensin Receptor Blocker (ARB) and should HOLD this medication for the 24 hours prior to surgery.    He will hold hydrochlorothiazide as well while fasting.      APPROVAL GIVEN to proceed with proposed procedure, without further diagnostic evaluation       Signed Electronically by: Aric Stephens MD    Copy of this evaluation report is provided to requesting physician.    [facility] Preop Guidelines    Revised Cardiac Risk Index

## 2018-12-18 NOTE — PROGRESS NOTES
Prior to injection, verified patient identity using patient's name and date of birth.  Due to injection administration, patient instructed to remain in clinic for 15 minutes  afterwards, and to report any adverse reaction to me immediately.    shingrix     Drug Amount Wasted:  None.  Vial/Syringe: Single dose vial  Amy Manning MA

## 2018-12-19 ENCOUNTER — TELEPHONE (OUTPATIENT)
Dept: FAMILY MEDICINE | Facility: CLINIC | Age: 63
End: 2018-12-19

## 2018-12-19 NOTE — LETTER
[address] [address]  [phone]        December 20, 2018    aBrbie Portillo  [address]  [facility] [address]

## 2018-12-19 NOTE — LETTER

## 2018-12-19 NOTE — TELEPHONE ENCOUNTER
Left message for patient to return call to schedule EGD/colonoscopy. If Amarilys or Merline are not available, please transfer to same day surgery

## 2018-12-20 ENCOUNTER — TELEPHONE (OUTPATIENT)
Dept: FAMILY MEDICINE | Facility: CLINIC | Age: 63
End: 2018-12-20

## 2018-12-20 NOTE — TELEPHONE ENCOUNTER
Cell phone went straight to  so called home phone.  LM asking patient to return our call.  Please inform him of the message below.  Jerry Lisa CMA

## 2018-12-20 NOTE — TELEPHONE ENCOUNTER
Date of colonoscopy/EGD: 1/15/19  Surgeon: Dr. Garcia  Prep:Miralax  Packet:Colonoscopy/EGD instructions mailed to patient's home address.   Date: 12/20/2018      Surgery Scheduler

## 2018-12-20 NOTE — TELEPHONE ENCOUNTER
----- Message from Aric Stephens MD sent at 12/20/2018  5:14 PM CST -----  Please notify patient of normal result.     Aric Stephens MD

## 2019-01-14 NOTE — H&P
[facility] History and Physical    Barbie Portillo MRN# [id number]   Age: 63 year old YOB: 1955     Date of Admission:  (Not on file)    Home clinic: [facility]  Primary care provider: Aric Stephens          Impression and Plan:   Impression:   screening  Last colonoscopy 2007      Plan:   Proceed to Colonoscopy as planned.  The procedure, risks(bleeding, perforation), benefits and alternatives were discussed and the patient agrees to proceed. Cleared for Anesthesia             Chief Complaint:   screening    History is obtained from the patient          History of Present Illness:   This 63 year old male is being seen at this time for evaluation of a colonoscopy.  Due for repeat.  No complaints.         Past Medical History:   Diagnosis Date     Essential hypertension, benign 2006    Hypertension, Benign     Obesity, unspecified      Old disruption of anterior cruciate ligament     ACL right knee      Other and unspecified disc disorder of unspecified region     Intervertebral disc disorders            Past Surgical History:     Past Surgical History:   Procedure Laterality Date     ANKLE SURGERY  2011    right      COLONOSCOPY  10/22/2007     SURGICAL HISTORY OF -   04/04/84    Bilateral testicular biopsy            Social History:     Social History     Tobacco Use     Smoking status: Never Smoker     Smokeless tobacco: Never Used   Substance Use Topics     Alcohol use: No            Family History:     Family History   Problem Relation Age of Onset     C.A.D. Father         snores     Diabetes Brother         x2     Hypertension Mother      Arthritis Mother      Prostate Cancer Maternal Grandfather             Immunizations:     VACCINE/DOSE   Diptheria   DPT   DTAP   HBIG   Hepatitis A   Hepatitis B   HIB   Influenza   Measles   Meningococcal   MMR   Mumps   Pneumococcal   Polio   Rubella   Small Pox   TDAP   Varicella   Zoster            Allergies:     Allergies    Allergen Reactions     No Known Drug Allergies             Medications:     No current facility-administered medications for this encounter.      Current Outpatient Medications   Medication Sig     aspirin 81 MG tablet Take 1 tablet (81 mg) by mouth daily     hydrochlorothiazide (HYDRODIURIL) 25 MG tablet Take 1 tablet (25 mg) by mouth every morning     Lactobacillus (PROBIOTIC ACIDOPHILUS PO)      lisinopril (PRINIVIL/ZESTRIL) 40 MG tablet Take 1 tablet (40 mg) by mouth daily     metoprolol succinate (TOPROL-XL) 200 MG 24 hr tablet Take 1 tablet (200 mg) by mouth daily     Multiple Vitamin (DAILY MULTIVITAMIN PO) Take 1 tablet by mouth daily.     PREBIOTIC PRODUCT PO      blood glucose (CHIKI BREEZE 2) test strip Use to test blood sugar 3 times daily or as directed.     blood glucose monitoring (CHIKI MICROLET) lancets Use to test blood sugar 3 times daily or as directed.     blood glucose monitoring (NO BRAND SPECIFIED) test strip Use to test blood sugar 3 times daily or as directed. To accompany: Blood Glucose Monitor Brands: per insurance.     STATIN NOT PRESCRIBED, INTENTIONAL, Statin not prescribed intentionally due to Other: not indicated due to extremely low LDL.     thin (NO BRAND SPECIFIED) lancets Use to test blood sugar 3 times daily or as directed. To accompany: Blood Glucose Monitor Brands: per insurance.             Review of Systems:   The review of systems was positive for the following findings.  None.  The remainder of the review of systems was unremarkable.          Physical Exam:   All vitals have been reviewed  There were no vitals taken for this visit.  No intake or output data in the 24 hours ending 01/14/19 1525  SHEENT examination revealed NC/AT, EOMI, (-)icterus  Examination of the chest revealed CTA  Examination of the heart revealed S1, S2, (-)m//g  Examination of the abdomen revealed soft, non tender, non distended  The neuromuscular examination was WNL          Data:   All laboratory  data reviewed  Results for orders placed or performed in visit on 12/18/18   Basic metabolic panel   Result Value Ref Range    Sodium 139 133 - 144 mmol/L    Potassium 3.6 3.4 - 5.3 mmol/L    Chloride 104 94 - 109 mmol/L    Carbon Dioxide 29 20 - 32 mmol/L    Anion Gap 6 3 - 14 mmol/L    Glucose 129 (H) 70 - 99 mg/dL    Urea Nitrogen 14 7 - 30 mg/dL    Creatinine 0.84 0.66 - 1.25 mg/dL    GFR Estimate >90 >60 mL/min/1.7m2    GFR Estimate If Black >90 >60 mL/min/1.7m2    Calcium 8.6 8.5 - 10.1 mg/dL        Yusef Garcia MD, FACS

## 2019-01-15 ENCOUNTER — ANESTHESIA (OUTPATIENT)
Dept: GASTROENTEROLOGY | Facility: CLINIC | Age: 64
End: 2019-01-15
Payer: COMMERCIAL

## 2019-01-15 ENCOUNTER — HOSPITAL ENCOUNTER (OUTPATIENT)
Facility: CLINIC | Age: 64
Discharge: HOME | End: 2019-01-15
Attending: SPECIALIST | Admitting: SPECIALIST
Payer: COMMERCIAL

## 2019-01-15 ENCOUNTER — ANESTHESIA EVENT (OUTPATIENT)
Dept: GASTROENTEROLOGY | Facility: CLINIC | Age: 64
End: 2019-01-15
Payer: COMMERCIAL

## 2019-01-15 VITALS
HEART RATE: 52 BPM | SYSTOLIC BLOOD PRESSURE: 147 MMHG | RESPIRATION RATE: 20 BRPM | OXYGEN SATURATION: 98 % | DIASTOLIC BLOOD PRESSURE: 85 MMHG | TEMPERATURE: 98 F

## 2019-01-15 LAB — COLONOSCOPY: NORMAL

## 2019-01-15 PROCEDURE — G0121 COLON CA SCRN NOT HI RSK IND: HCPCS | Performed by: SPECIALIST

## 2019-01-15 PROCEDURE — 25000125 ZZHC RX 250: Performed by: SPECIALIST

## 2019-01-15 PROCEDURE — 45378 DIAGNOSTIC COLONOSCOPY: CPT | Performed by: SPECIALIST

## 2019-01-15 PROCEDURE — 25000125 ZZHC RX 250: Performed by: NURSE ANESTHETIST, CERTIFIED REGISTERED

## 2019-01-15 PROCEDURE — 25000128 H RX IP 250 OP 636: Performed by: NURSE ANESTHETIST, CERTIFIED REGISTERED

## 2019-01-15 PROCEDURE — 37000008 ZZH ANESTHESIA TECHNICAL FEE, 1ST 30 MIN: Performed by: SPECIALIST

## 2019-01-15 PROCEDURE — 40000296 ZZH STATISTIC ENDO RECOVERY CLASS 1:2 FIRST HOUR: Performed by: SPECIALIST

## 2019-01-15 RX ORDER — SODIUM CHLORIDE, SODIUM LACTATE, POTASSIUM CHLORIDE, CALCIUM CHLORIDE 600; 310; 30; 20 MG/100ML; MG/100ML; MG/100ML; MG/100ML
INJECTION, SOLUTION INTRAVENOUS CONTINUOUS
Status: DISCONTINUED | OUTPATIENT
Start: 2019-01-15 | End: 2019-01-15 | Stop reason: HOSPADM

## 2019-01-15 RX ORDER — PROPOFOL 10 MG/ML
INJECTION, EMULSION INTRAVENOUS PRN
Status: DISCONTINUED | OUTPATIENT
Start: 2019-01-15 | End: 2019-01-15

## 2019-01-15 RX ORDER — ONDANSETRON 2 MG/ML
4 INJECTION INTRAMUSCULAR; INTRAVENOUS EVERY 30 MIN PRN
Status: DISCONTINUED | OUTPATIENT
Start: 2019-01-15 | End: 2019-01-15 | Stop reason: HOSPADM

## 2019-01-15 RX ORDER — HYDROXYZINE HCL 10 MG/5 ML
10 SOLUTION, ORAL ORAL 3 TIMES DAILY
COMMUNITY
End: 2019-07-01

## 2019-01-15 RX ORDER — PROPOFOL 10 MG/ML
INJECTION, EMULSION INTRAVENOUS CONTINUOUS PRN
Status: DISCONTINUED | OUTPATIENT
Start: 2019-01-15 | End: 2019-01-15

## 2019-01-15 RX ORDER — ALBUTEROL SULFATE 0.83 MG/ML
2.5 SOLUTION RESPIRATORY (INHALATION) EVERY 4 HOURS PRN
Status: DISCONTINUED | OUTPATIENT
Start: 2019-01-15 | End: 2019-01-15 | Stop reason: HOSPADM

## 2019-01-15 RX ORDER — LIDOCAINE HYDROCHLORIDE 20 MG/ML
INJECTION, SOLUTION INFILTRATION; PERINEURAL PRN
Status: DISCONTINUED | OUTPATIENT
Start: 2019-01-15 | End: 2019-01-15

## 2019-01-15 RX ORDER — SODIUM CHLORIDE 9 MG/ML
INJECTION, SOLUTION INTRAVENOUS CONTINUOUS
Status: DISCONTINUED | OUTPATIENT
Start: 2019-01-15 | End: 2019-01-15 | Stop reason: HOSPADM

## 2019-01-15 RX ORDER — NALOXONE HYDROCHLORIDE 0.4 MG/ML
.1-.4 INJECTION, SOLUTION INTRAMUSCULAR; INTRAVENOUS; SUBCUTANEOUS
Status: DISCONTINUED | OUTPATIENT
Start: 2019-01-15 | End: 2019-01-15 | Stop reason: HOSPADM

## 2019-01-15 RX ORDER — LIDOCAINE 40 MG/G
CREAM TOPICAL
Status: DISCONTINUED | OUTPATIENT
Start: 2019-01-15 | End: 2019-01-15 | Stop reason: HOSPADM

## 2019-01-15 RX ORDER — FENTANYL CITRATE 50 UG/ML
25-50 INJECTION, SOLUTION INTRAMUSCULAR; INTRAVENOUS
Status: DISCONTINUED | OUTPATIENT
Start: 2019-01-15 | End: 2019-01-15 | Stop reason: HOSPADM

## 2019-01-15 RX ORDER — OXYCODONE AND ACETAMINOPHEN 5; 325 MG/1; MG/1
1 TABLET ORAL EVERY 6 HOURS PRN
COMMUNITY
End: 2019-07-01

## 2019-01-15 RX ORDER — HYDROXYZINE HYDROCHLORIDE 25 MG/1
25 TABLET, FILM COATED ORAL EVERY 6 HOURS PRN
COMMUNITY
End: 2020-07-17

## 2019-01-15 RX ORDER — ONDANSETRON 4 MG/1
4 TABLET, ORALLY DISINTEGRATING ORAL EVERY 30 MIN PRN
Status: DISCONTINUED | OUTPATIENT
Start: 2019-01-15 | End: 2019-01-15 | Stop reason: HOSPADM

## 2019-01-15 RX ORDER — MEPERIDINE HYDROCHLORIDE 25 MG/ML
12.5 INJECTION INTRAMUSCULAR; INTRAVENOUS; SUBCUTANEOUS
Status: DISCONTINUED | OUTPATIENT
Start: 2019-01-15 | End: 2019-01-15 | Stop reason: HOSPADM

## 2019-01-15 RX ORDER — SODIUM CHLORIDE, SODIUM LACTATE, POTASSIUM CHLORIDE, CALCIUM CHLORIDE 600; 310; 30; 20 MG/100ML; MG/100ML; MG/100ML; MG/100ML
INJECTION, SOLUTION INTRAVENOUS CONTINUOUS PRN
Status: DISCONTINUED | OUTPATIENT
Start: 2019-01-15 | End: 2019-01-15

## 2019-01-15 RX ADMIN — LIDOCAINE HYDROCHLORIDE 40 MG: 20 INJECTION, SOLUTION INFILTRATION; PERINEURAL at 14:09

## 2019-01-15 RX ADMIN — PROPOFOL 150 MCG/KG/MIN: 10 INJECTION, EMULSION INTRAVENOUS at 14:09

## 2019-01-15 RX ADMIN — PROPOFOL 70 MG: 10 INJECTION, EMULSION INTRAVENOUS at 14:09

## 2019-01-15 RX ADMIN — LIDOCAINE HYDROCHLORIDE 0.1 ML: 10 INJECTION, SOLUTION EPIDURAL; INFILTRATION; INTRACAUDAL; PERINEURAL at 13:10

## 2019-01-15 RX ADMIN — SODIUM CHLORIDE, POTASSIUM CHLORIDE, SODIUM LACTATE AND CALCIUM CHLORIDE: 600; 310; 30; 20 INJECTION, SOLUTION INTRAVENOUS at 13:59

## 2019-01-15 NOTE — ANESTHESIA CARE TRANSFER NOTE
Patient: Barbie Portillo    Procedure(s):  COLONOSCOPY    Diagnosis: screening  Diagnosis Additional Information: No value filed.    Anesthesia Type:   MAC     Note:  Airway :Face Mask  Patient transferred to:Phase II  Handoff Report: Identifed the Patient, Identified the Reponsible Provider, Reviewed the pertinent medical history, Discussed the surgical course, Reviewed Intra-OP anesthesia mangement and issues during anesthesia, Set expectations for post-procedure period and Allowed opportunity for questions and acknowledgement of understanding      Vitals: (Last set prior to Anesthesia Care Transfer)    CRNA VITALS  1/15/2019 1358 - 1/15/2019 1432      1/15/2019             Pulse:  56    SpO2:  99 %                Electronically Signed By: SHIVANI Mann CRNA  January 15, 2019  2:32 PM

## 2019-01-15 NOTE — ANESTHESIA PREPROCEDURE EVALUATION
Anesthesia Pre-Procedure Evaluation    Patient: Barbie Portillo   MRN: [id number] : 1955          Preoperative Diagnosis: screening    Procedure(s):  COLONOSCOPY    Past Medical History:   Diagnosis Date     Essential hypertension, benign 2006    Hypertension, Benign     Obesity, unspecified      Old disruption of anterior cruciate ligament     ACL right knee      Other and unspecified disc disorder of unspecified region     Intervertebral disc disorders     Past Surgical History:   Procedure Laterality Date     ANKLE SURGERY  2011    right      COLONOSCOPY  10/22/2007     SURGICAL HISTORY OF -   84    Bilateral testicular biopsy       Anesthesia Evaluation     . Pt has had prior anesthetic. Type: General and MAC           ROS/MED HX    ENT/Pulmonary:     (+)sleep apnea, , . .    Neurologic:  - neg neurologic ROS     Cardiovascular:     (+) hypertension----. : . . . :. . Previous cardiac testing date:results:date: results:ECG reviewed date: results:SR Right BBB date: results:          METS/Exercise Tolerance:  >4 METS   Hematologic:  - neg hematologic  ROS       Musculoskeletal:  - neg musculoskeletal ROS       GI/Hepatic:  - neg GI/hepatic ROS       Renal/Genitourinary:  - ROS Renal section negative       Endo:     (+) type II DM Last HgA1c: 6.2 date: 19 Obesity, .      Psychiatric:  - neg psychiatric ROS       Infectious Disease:  - neg infectious disease ROS       Malignancy:      - no malignancy   Other:    - neg other ROS                      Physical Exam  Normal systems: cardiovascular, pulmonary and dental    Airway   Mallampati: I  TM distance: >3 FB  Neck ROM: full    Dental     Cardiovascular   Rhythm and rate: regular and normal      Pulmonary    breath sounds clear to auscultation            Lab Results   Component Value Date     2018    POTASSIUM 3.6 2018    CHLORIDE 104 2018    CO2 29 2018    BUN 14 2018    CR 0.84 2018     (H)  12/18/2018    JUSTYNA 8.6 12/18/2018    ALT 43 09/25/2007    AST 28 09/25/2007    TSH 1.38 07/08/2017       Preop Vitals  BP Readings from Last 3 Encounters:   12/18/18 134/72   06/28/18 136/74   06/30/17 134/76    Pulse Readings from Last 3 Encounters:   12/18/18 76   06/28/18 71   06/30/17 66      Resp Readings from Last 3 Encounters:   12/18/18 20   06/28/18 16   06/30/17 18    SpO2 Readings from Last 3 Encounters:   12/18/18 98%   06/28/18 95%   06/30/17 98%      Temp Readings from Last 1 Encounters:   12/18/18 98.1  F (36.7  C) (Temporal)    Ht Readings from Last 1 Encounters:   12/18/18 1.829 m (6')      Wt Readings from Last 1 Encounters:   12/18/18 119.7 kg (264 lb)    Estimated body mass index is 35.8 kg/m  as calculated from the following:    Height as of 12/18/18: 1.829 m (6').    Weight as of 12/18/18: 119.7 kg (264 lb).       Anesthesia Plan      History & Physical Review  History and physical reviewed and following examination; no interval change.    ASA Status:  3 .    NPO Status:  > 4 hours    Plan for MAC with Propofol induction. Maintenance will be TIVA.  Reason for MAC:  Deep or markedly invasive procedure (G8)         Postoperative Care  Postoperative pain management:  IV analgesics.      Consents  Anesthetic plan, risks, benefits and alternatives discussed with:  Patient..                 SHIVANI George CRNA

## 2019-01-15 NOTE — DISCHARGE INSTRUCTIONS
[facility]    Home Care Following Endoscopy          Activity:    You have just undergone an endoscopic procedure usually performed with conscious sedation.  Do not work or operate machinery (including a car) for at least 12 hours.      I encourage you to walk and attempt to pass this air as soon as possible.    Diet:    Return to the diet you were on before your procedure but eat lightly for the first 12-24 hours.    Drink plenty of water.    Resume any regular medications unless otherwise advised by your physician.  Please begin any new medication prescribed as a result of your procedure as directed by your physician.     If you had any biopsy or polyp removed please refrain from aspirin or aspirin products for 2 days.  If on Coumadin please restart as instructed by your physician.   Pain:    You may take Tylenol as needed for pain.  Expected Recovery:    You can expect some mild abdominal fullness and/or discomfort due to the air used to inflate your intestinal tract. It is also normal to have a mild sore throat after upper endoscopy.    Call Your Physician if You Have:    After Colonoscopy:  o Worsening persisting abdominal pain which is worse with activity.  o Fevers (>101 degrees F), chills or shakes.  o Passage of continued blood with bowel movements.   Any questions or concerns about your recovery, please call [phone] or after hours [phone] Nurse Advice Line.    Follow-up Care:    You should receive a call or letter with your results within 1 week. Please call if you have not received a notification of your results.  If asked to return to clinic please make an appointment 1 week after your procedure.  Call [phone].

## 2019-01-15 NOTE — ANESTHESIA POSTPROCEDURE EVALUATION
Patient: Barbie Portillo    Procedure(s):  COLONOSCOPY    Diagnosis:screening  Diagnosis Additional Information: No value filed.    Anesthesia Type:  MAC    Note:  Anesthesia Post Evaluation    Patient location during evaluation: Phase 2  Patient participation: Able to fully participate in evaluation  Level of consciousness: awake  Pain management: adequate  Airway patency: patent  Cardiovascular status: acceptable and hemodynamically stable  Respiratory status: acceptable, room air and nonlabored ventilation  Hydration status: stable  PONV: none     Anesthetic complications: None    Comments: Patient was happy with the anesthesia care received and no anesthesia related complications were noted.  I will follow up with the patient again if it is needed.        Last vitals:  Vitals:    01/15/19 1311 01/15/19 1317   BP: (!) 138/110 136/84   Resp: 20    Temp: 98  F (36.7  C)    SpO2: 96%          Electronically Signed By: SHIVANI Mann CRNA  January 15, 2019  2:38 PM

## 2019-07-01 ENCOUNTER — OFFICE VISIT (OUTPATIENT)
Dept: FAMILY MEDICINE | Facility: CLINIC | Age: 64
End: 2019-07-01
Payer: COMMERCIAL

## 2019-07-01 VITALS
TEMPERATURE: 98.2 F | RESPIRATION RATE: 18 BRPM | SYSTOLIC BLOOD PRESSURE: 138 MMHG | WEIGHT: 256 LBS | OXYGEN SATURATION: 99 % | DIASTOLIC BLOOD PRESSURE: 74 MMHG | BODY MASS INDEX: 34.67 KG/M2 | HEIGHT: 72 IN | HEART RATE: 70 BPM

## 2019-07-01 DIAGNOSIS — G47.33 OSA (OBSTRUCTIVE SLEEP APNEA): ICD-10-CM

## 2019-07-01 DIAGNOSIS — H93.13 TINNITUS, BILATERAL: ICD-10-CM

## 2019-07-01 DIAGNOSIS — Z71.89 COUNSELING REGARDING ADVANCED DIRECTIVES: ICD-10-CM

## 2019-07-01 DIAGNOSIS — I10 ESSENTIAL HYPERTENSION WITH GOAL BLOOD PRESSURE LESS THAN 140/90: ICD-10-CM

## 2019-07-01 DIAGNOSIS — Z00.00 ROUTINE GENERAL MEDICAL EXAMINATION AT A HEALTH CARE FACILITY: Primary | ICD-10-CM

## 2019-07-01 DIAGNOSIS — E66.9 OBESITY (BMI 30-39.9): ICD-10-CM

## 2019-07-01 DIAGNOSIS — E11.9 TYPE 2 DIABETES MELLITUS WITHOUT COMPLICATION, WITHOUT LONG-TERM CURRENT USE OF INSULIN (H): ICD-10-CM

## 2019-07-01 PROBLEM — E66.01 SEVERE OBESITY (BMI 35.0-39.9) WITH COMORBIDITY (H): Status: RESOLVED | Noted: 2018-12-18 | Resolved: 2019-07-01

## 2019-07-01 PROCEDURE — 99207 C FOOT EXAM  NO CHARGE: CPT | Mod: 25 | Performed by: FAMILY MEDICINE

## 2019-07-01 PROCEDURE — 99396 PREV VISIT EST AGE 40-64: CPT | Performed by: FAMILY MEDICINE

## 2019-07-01 PROCEDURE — 99213 OFFICE O/P EST LOW 20 MIN: CPT | Mod: 25 | Performed by: FAMILY MEDICINE

## 2019-07-01 RX ORDER — LISINOPRIL 40 MG/1
40 TABLET ORAL DAILY
Qty: 90 TABLET | Refills: 3 | Status: SHIPPED | OUTPATIENT
Start: 2019-07-01 | End: 2020-07-17

## 2019-07-01 RX ORDER — HYDROCHLOROTHIAZIDE 25 MG/1
25 TABLET ORAL EVERY MORNING
Qty: 90 TABLET | Refills: 3 | Status: SHIPPED | OUTPATIENT
Start: 2019-07-01 | End: 2020-07-17

## 2019-07-01 RX ORDER — METOPROLOL SUCCINATE 200 MG/1
200 TABLET, EXTENDED RELEASE ORAL DAILY
Qty: 90 TABLET | Refills: 3 | Status: SHIPPED | OUTPATIENT
Start: 2019-07-01 | End: 2020-07-17

## 2019-07-01 ASSESSMENT — MIFFLIN-ST. JEOR: SCORE: 1994.21

## 2019-07-01 ASSESSMENT — PAIN SCALES - GENERAL: PAINLEVEL: MILD PAIN (3)

## 2019-07-01 NOTE — PROGRESS NOTES
SUBJECTIVE:   CC: Barbie Portillo is an 63 year old male who presents for preventative health visit.     Healthy Habits:    Getting at least 3 servings of Calcium per day:  Yes    Bi-annual eye exam:  Yes    Dental care twice a year:  Yes    Sleep apnea or symptoms of sleep apnea:  Sleep apnea    Diet:  Diabetic    Frequency of exercise:  4-5 days/week    Duration of exercise:  15-30 minutes    Taking medications regularly:  Yes    Barriers to taking medications:  Not applicable    Medication side effects:  Not applicable    PHQ-2 Total Score:    Additional concerns today:  No      Has had tinnitus for the past few months.  Has been bothersome.  A mild whisper.  Has had long time exposure to loud noise without hearing protection.  No dizziness/lightheadedness.  No nausea/vomiting.  Did wear muffs with firearm target practice.          Today's PHQ-2 Score:   PHQ-2 ( 1999 [employer]) 7/1/2019   Q1: Little interest or pleasure in doing things 0   Q2: Feeling down, depressed or hopeless 0   PHQ-2 Score 0       Abuse: Current or Past(Physical, Sexual or Emotional)- No  Do you feel safe in your environment? Yes    Social History     Tobacco Use     Smoking status: Never Smoker     Smokeless tobacco: Never Used   Substance Use Topics     Alcohol use: No         No flowsheet data found.    Last PSA:   PSA   Date Value Ref Range Status   06/30/2018 0.76 0 - 4 ug/L Final     Comment:     Assay Method:  Chemiluminescence using Siemens Vista analyzer       Reviewed orders with patient. Reviewed health maintenance and updated orders accordingly - Yes      Reviewed and updated as needed this visit by clinical staff  Tobacco  Allergies  Meds  Problems  Med Hx  Surg Hx  Fam Hx  Soc Hx          Reviewed and updated as needed this visit by Provider  Tobacco  Allergies  Meds  Problems  Med Hx  Surg Hx  Fam Hx            Review of Systems  CONSTITUTIONAL: NEGATIVE for fever, chills, change in weight  INTEGUMENTARY/SKIN:  NEGATIVE for worrisome rashes, moles or lesions  EYES: NEGATIVE for vision changes or irritation  ENT: NEGATIVE for ear, mouth and throat problems except for tinnitus.  RESP: NEGATIVE for significant cough or SOB  CV: NEGATIVE for chest pain, palpitations or peripheral edema  GI: NEGATIVE for nausea, abdominal pain, heartburn, or change in bowel habits   male: negative for dysuria, hematuria, decreased urinary stream, erectile dysfunction, urethral discharge  MUSCULOSKELETAL: NEGATIVE for significant arthralgias or myalgia  NEURO: NEGATIVE for weakness, dizziness or paresthesias  PSYCHIATRIC: NEGATIVE for changes in mood or affect    OBJECTIVE:   /74   Pulse 70   Temp 98.2  F (36.8  C) (Temporal)   Resp 18   Ht 1.829 m (6')   Wt 116.1 kg (256 lb)   SpO2 99%   BMI 34.72 kg/m      Physical Exam   Constitutional: He is oriented to person, place, and time. He appears well-developed and well-nourished. He is active. No distress.   HENT:   Head: Normocephalic and atraumatic.   Right Ear: Hearing, tympanic membrane, external ear and ear canal normal.   Left Ear: Hearing, tympanic membrane, external ear and ear canal normal.   Nose: Nose normal.   Mouth/Throat: Uvula is midline, oropharynx is clear and moist and mucous membranes are normal. No oral lesions. No oropharyngeal exudate.   Eyes: Pupils are equal, round, and reactive to light. Conjunctivae, EOM and lids are normal. Right eye exhibits no discharge. Left eye exhibits no discharge. No scleral icterus.   Neck: Normal range of motion. Neck supple. No tracheal deviation present. No thyroid mass and no thyromegaly present.   Cardiovascular: Normal rate, regular rhythm, S1 normal, S2 normal, normal heart sounds and normal pulses. Exam reveals no S3, no S4 and no friction rub.   No murmur heard.  Pulmonary/Chest: Effort normal and breath sounds normal. No respiratory distress. He has no wheezes. He has no rales.   Abdominal: Soft. Bowel sounds are normal.  He exhibits no distension and no mass. There is no hepatosplenomegaly. There is no tenderness. There is no guarding.   Musculoskeletal: Normal range of motion. He exhibits no edema or deformity.   FEET:  monofilament exam normal bilaterally.  Good hair growth.  Dorsalis pedis pulses 2+ bilaterally.  Feet warm.    Lymphadenopathy:     He has no cervical adenopathy.        Right: No supraclavicular adenopathy present.        Left: No supraclavicular adenopathy present.   Neurological: He is alert and oriented to person, place, and time. He has normal strength and normal reflexes. He exhibits normal muscle tone.   Skin: Skin is warm and dry. No lesion and no rash noted.   Psychiatric: He has a normal mood and affect. His speech is normal. Judgment and thought content normal. Cognition and memory are normal.         ASSESSMENT/PLAN:     ASSESSMENT/ORDERS:    ICD-10-CM    1. Routine general medical examination at a health care facility Z00.00 FOOT EXAM   2. Type 2 diabetes mellitus without complication, without long-term current use of insulin (H) E11.9 blood glucose (NO BRAND SPECIFIED) test strip     Lipid panel reflex to direct LDL Fasting     Hemoglobin A1c     Albumin Random Urine Quantitative with Creat Ratio     TSH with free T4 reflex   3. Counseling regarding advanced directives Z71.89    4. Essential hypertension with goal blood pressure less than 140/90 I10 metoprolol succinate ER (TOPROL-XL) 200 MG 24 hr tablet     lisinopril (PRINIVIL/ZESTRIL) 40 MG tablet     hydrochlorothiazide (HYDRODIURIL) 25 MG tablet   5. Obesity (BMI 30-39.9) E66.9    6. Tinnitus, bilateral H93.13    7. SEVERE NASRIN (obstructive sleep apnea) G47.33      PLAN:  1.  Referral to ENT/audiology for tinnitus as he may have hearing loss.  may have other issues too, so ENT evaluation is warranted.     2.   Medications refilled for all other above noted stable conditions.  Labs ordered as noted above.   3.  Is seeing sleep clinic for obstructive  sleep apnea.    COUNSELING:   Reviewed preventive health counseling, as reflected in patient instructions    Estimated body mass index is 34.72 kg/m  as calculated from the following:    Height as of this encounter: 1.829 m (6').    Weight as of this encounter: 116.1 kg (256 lb).     Weight management plan: Discussed healthy diet and exercise guidelines     reports that he has never smoked. He has never used smokeless tobacco.      Counseling Resources:  ATP IV Guidelines  Pooled Cohorts Equation Calculator  FRAX Risk Assessment  ICSI Preventive Guidelines  Dietary Guidelines for Americans, 2010  USDA's MyPlate  ASA Prophylaxis  Lung CA Screening    Aric Stephens MD  [facility]

## 2019-07-02 DIAGNOSIS — Z01.818 PREOP GENERAL PHYSICAL EXAM: ICD-10-CM

## 2019-07-02 DIAGNOSIS — E11.9 TYPE 2 DIABETES MELLITUS WITHOUT COMPLICATION, WITHOUT LONG-TERM CURRENT USE OF INSULIN (H): ICD-10-CM

## 2019-07-02 LAB
CHOLEST SERPL-MCNC: 115 MG/DL
CREAT UR-MCNC: 144 MG/DL
GLUCOSE SERPL-MCNC: 93 MG/DL (ref 70–99)
HBA1C MFR BLD: 6.3 % (ref 0–5.6)
HDLC SERPL-MCNC: 40 MG/DL
LDLC SERPL CALC-MCNC: 42 MG/DL
MICROALBUMIN UR-MCNC: 44 MG/L
MICROALBUMIN/CREAT UR: 30.69 MG/G CR (ref 0–17)
NONHDLC SERPL-MCNC: 75 MG/DL
TRIGL SERPL-MCNC: 165 MG/DL
TSH SERPL DL<=0.005 MIU/L-ACNC: 2.97 MU/L (ref 0.4–4)

## 2019-07-02 PROCEDURE — 36415 COLL VENOUS BLD VENIPUNCTURE: CPT | Performed by: FAMILY MEDICINE

## 2019-07-02 PROCEDURE — 80061 LIPID PANEL: CPT | Performed by: FAMILY MEDICINE

## 2019-07-02 PROCEDURE — 84443 ASSAY THYROID STIM HORMONE: CPT | Performed by: FAMILY MEDICINE

## 2019-07-02 PROCEDURE — 83036 HEMOGLOBIN GLYCOSYLATED A1C: CPT | Mod: QW | Performed by: FAMILY MEDICINE

## 2019-07-02 PROCEDURE — 82043 UR ALBUMIN QUANTITATIVE: CPT | Performed by: FAMILY MEDICINE

## 2019-07-02 PROCEDURE — 82947 ASSAY GLUCOSE BLOOD QUANT: CPT | Performed by: FAMILY MEDICINE

## 2019-07-03 ENCOUNTER — TELEPHONE (OUTPATIENT)
Dept: FAMILY MEDICINE | Facility: CLINIC | Age: 64
End: 2019-07-03

## 2019-07-03 NOTE — TELEPHONE ENCOUNTER
----- Message from Aric Stephens MD sent at 7/2/2019  9:04 PM CDT -----  Please notify patient of results.  Hemoglobin A1c is good as well as the rest of his results.    Aric Stephens MD

## 2019-07-03 NOTE — RESULT ENCOUNTER NOTE
Please notify patient of results.  Hemoglobin A1c is good as well as the rest of his results.    Aric Stephens MD

## 2019-07-19 NOTE — PROGRESS NOTES
ENT Consultation    Barbie Portillo who is a 63 year old male seen in consultation at the request of Aric Stephens MD.      History of Present Illness - Barbie Portillo is a 63 year old male presents for evaluation of tinnitus in both ears.  It sounds more like a high-frequency noise nonpulsatile.  Patient has been exposed to a lot of loud noises in his work environment in the past before he retired.  No family history of hearing loss no history of otologic infections or any other trauma.  He hears the tinnitus mostly with quiet.  Past Medical History -   Past Medical History:   Diagnosis Date     Essential hypertension, benign 2006    Hypertension, Benign     Obesity, unspecified      Old disruption of anterior cruciate ligament     ACL right knee      Other and unspecified disc disorder of unspecified region     Intervertebral disc disorders       Current Medications -   Current Outpatient Medications:      aspirin 81 MG tablet, Take 1 tablet (81 mg) by mouth daily, Disp: 30 tablet, Rfl:      blood glucose (NO BRAND SPECIFIED) test strip, Use to test blood sugar 1-2 times daily or as directed., Disp: 50 each, Rfl: 11     blood glucose monitoring (CHIKI MICROLET) lancets, Use to test blood sugar 3 times daily or as directed., Disp: 1 each, Rfl: 11     blood glucose monitoring (NO BRAND SPECIFIED) test strip, Use to test blood sugar 3 times daily or as directed. To accompany: Blood Glucose Monitor Brands: per insurance., Disp: 100 strip, Rfl: 6     hydrochlorothiazide (HYDRODIURIL) 25 MG tablet, Take 1 tablet (25 mg) by mouth every morning, Disp: 90 tablet, Rfl: 3     hydrOXYzine (ATARAX) 25 MG tablet, Take 25 mg by mouth every 6 hours as needed for itching, Disp: , Rfl:      Lactobacillus (PROBIOTIC ACIDOPHILUS PO), , Disp: , Rfl:      lisinopril (PRINIVIL/ZESTRIL) 40 MG tablet, Take 1 tablet (40 mg) by mouth daily, Disp: 90 tablet, Rfl: 3     metoprolol succinate ER (TOPROL-XL) 200 MG 24 hr tablet, Take 1  tablet (200 mg) by mouth daily, Disp: 90 tablet, Rfl: 3     Multiple Vitamin (DAILY MULTIVITAMIN PO), Take 1 tablet by mouth daily., Disp: , Rfl:      STATIN NOT PRESCRIBED, INTENTIONAL,, Statin not prescribed intentionally due to Other: not indicated due to extremely low LDL., Disp: , Rfl: 0     thin (NO BRAND SPECIFIED) lancets, Use to test blood sugar 3 times daily or as directed. To accompany: Blood Glucose Monitor Brands: per insurance., Disp: 200 each, Rfl: 1    Allergies -   Allergies   Allergen Reactions     No Known Drug Allergies        Social History -   Social History     Socioeconomic History     Marital status:      Spouse name: Not on file     Number of children: Not on file     Years of education: Not on file     Highest education level: Not on file   Occupational History     Employer: [employer]     Comment: / at [employer]   Social Needs     Financial resource strain: Not on file     Food insecurity:     Worry: Not on file     Inability: Not on file     Transportation needs:     Medical: Not on file     Non-medical: Not on file   Tobacco Use     Smoking status: Never Smoker     Smokeless tobacco: Never Used   Substance and Sexual Activity     Alcohol use: No     Drug use: No     Sexual activity: Yes     Partners: Female   Lifestyle     Physical activity:     Days per week: Not on file     Minutes per session: Not on file     Stress: Not on file   Relationships     Social connections:     Talks on phone: Not on file     Gets together: Not on file     Attends Taoist service: Not on file     Active member of club or organization: Not on file     Attends meetings of clubs or organizations: Not on file     Relationship status: Not on file     Intimate partner violence:     Fear of current or ex partner: Not on file     Emotionally abused: Not on file     Physically abused: Not on file     Forced sexual activity: Not on file   Other Topics Concern      Service Not  Asked     Blood Transfusions Not Asked     Caffeine Concern No     Occupational Exposure Not Asked     Hobby Hazards Not Asked     Sleep Concern Yes     Stress Concern Not Asked     Weight Concern Not Asked     Special Diet Not Asked     Back Care Not Asked     Exercise Not Asked     Bike Helmet Not Asked     Seat Belt Not Asked     Self-Exams Not Asked     Parent/sibling w/ CABG, MI or angioplasty before 65F 55M? Not Asked   Social History Narrative     Not on file       Family History -   Family History   Problem Relation Age of Onset     C.A.D. Father         snores     Diabetes Brother         x2     Hypertension Mother      Arthritis Mother      Prostate Cancer Maternal Grandfather        Review of Systems - As per HPI and PMHx, otherwise review of system review of the head and neck negative. Otherwise 10+ review of system is negative    Physical Exam  There were no vitals taken for this visit.  BMI: There is no height or weight on file to calculate BMI.    General - The patient is well nourished and well developed, and appears to have good nutritional status.  Alert and oriented to person and place, answers questions and cooperates with examination appropriately.    SKIN - No suspicious lesions or rashes.  Respiration - No respiratory distress.  Head and Face - Normocephalic and atraumatic, with no gross asymmetry noted of the contour of the facial features.  The facial nerve is intact, with strong symmetric movements.    Voice and Breathing - The patient was breathing comfortably without the use of accessory muscles. The patients voice was clear and strong, and had appropriate pitch and quality.    Ears - Bilateral pinna and EACs with normal appearing overlying skin. Tympanic membrane intact with good mobility on pneumatic otoscopy bilaterally. Bony landmarks of the ossicular chain are normal. The tympanic membranes are normal in appearance. No retraction, perforation, or masses.  No fluid or purulence was  seen in the external canal or the middle ear.     Eyes - Extraocular movements intact.  Sclera were not icteric or injected, conjunctiva were pink and moist.    Mouth - Examination of the oral cavity showed pink, healthy oral mucosa. No lesions or ulcerations noted.  The tongue was mobile and midline, and the dentition were in good condition.      Throat - The walls of the oropharynx were smooth, pink, moist, symmetric, and had no lesions or ulcerations.  The tonsillar pillars and soft palate were symmetric.  The uvula was midline on elevation.    Neck - Normal midline excursion of the laryngotracheal complex during swallowing.  Full range of motion on passive movement.  Palpation of the occipital, submental, submandibular, internal jugular chain, and supraclavicular nodes did not demonstrate any abnormal lymph nodes or masses.  The carotid pulse was palpable bilaterally.  Palpation of the thyroid was soft and smooth, with no nodules or goiter appreciated.  The trachea was mobile and midline.    Nose - External contour is symmetric, no gross deflection or scars.  Nasal mucosa is pink and moist with no abnormal mucus.  The septum was midline and non-obstructive, turbinates of normal size and position.  No polyps, masses, or purulence noted on examination.    Neuro - Nonfocal neuro exam is normal, CN 2 through 12 intact, normal gait and muscle tone.      Performed in clinic today:  Audiologic Studies - An audiogram and tympanogram were performed today as part of the evaluation and personally reviewed. The tympanogram shows Type a curves on the right and Type a curves on the left, with Normal canal volumes and middle ear pressures.  The audiogram showed Very mild high-frequency SNHL on the right and Very mild high-frequency SNHLon the left.  Excellent word recognition score 100% on the right versus 96% on the left.      A/P - Barbie Portillo is a 63 year old male with a bilateral tinnitus and a very mild  high-frequency loss reflective of that.  We discussed tinnitus coping strategies avoidance of caffeine salt salicylates.  We discussed white noise introduction.  Patient understands he can cope quite well with it and will continue masking it with white noise which seems to help recheck the hearing in the year or year and a half.      Pelon Wagner MD

## 2019-07-29 ENCOUNTER — OFFICE VISIT (OUTPATIENT)
Dept: AUDIOLOGY | Facility: CLINIC | Age: 64
End: 2019-07-29
Payer: COMMERCIAL

## 2019-07-29 ENCOUNTER — OFFICE VISIT (OUTPATIENT)
Dept: OTOLARYNGOLOGY | Facility: CLINIC | Age: 64
End: 2019-07-29
Payer: COMMERCIAL

## 2019-07-29 VITALS
WEIGHT: 256 LBS | BODY MASS INDEX: 34.67 KG/M2 | HEIGHT: 72 IN | DIASTOLIC BLOOD PRESSURE: 78 MMHG | SYSTOLIC BLOOD PRESSURE: 148 MMHG

## 2019-07-29 DIAGNOSIS — H90.3 SENSORINEURAL HEARING LOSS, BILATERAL: ICD-10-CM

## 2019-07-29 DIAGNOSIS — H93.13 BILATERAL TINNITUS: Primary | ICD-10-CM

## 2019-07-29 DIAGNOSIS — H90.3 SENSORINEURAL HEARING LOSS, BILATERAL: Primary | ICD-10-CM

## 2019-07-29 DIAGNOSIS — H93.13 TINNITUS OF BOTH EARS: ICD-10-CM

## 2019-07-29 PROCEDURE — 99207 ZZC NO CHARGE LOS: CPT | Performed by: AUDIOLOGIST

## 2019-07-29 PROCEDURE — 99243 OFF/OP CNSLTJ NEW/EST LOW 30: CPT | Performed by: OTOLARYNGOLOGY

## 2019-07-29 PROCEDURE — 92550 TYMPANOMETRY & REFLEX THRESH: CPT | Performed by: AUDIOLOGIST

## 2019-07-29 PROCEDURE — 92557 COMPREHENSIVE HEARING TEST: CPT | Performed by: AUDIOLOGIST

## 2019-07-29 ASSESSMENT — MIFFLIN-ST. JEOR: SCORE: 1994.21

## 2019-07-29 ASSESSMENT — PAIN SCALES - GENERAL: PAINLEVEL: NO PAIN (0)

## 2019-07-29 NOTE — LETTER
7/29/2019         RE: Barbie Portillo  [id number] [facility] [address]        Dear Colleague,    Thank you for referring your patient, Barbie Portillo, to the [facility]. Please see a copy of my visit note below.    ENT Consultation    Barbie Portillo who is a 63 year old male seen in consultation at the request of Aric Stephens MD.      History of Present Illness - Barbie Portillo is a 63 year old male presents for evaluation of tinnitus in both ears.  It sounds more like a high-frequency noise nonpulsatile.  Patient has been exposed to a lot of loud noises in his work environment in the past before he retired.  No family history of hearing loss no history of otologic infections or any other trauma.  He hears the tinnitus mostly with quiet.  Past Medical History -   Past Medical History:   Diagnosis Date     Essential hypertension, benign 2006    Hypertension, Benign     Obesity, unspecified      Old disruption of anterior cruciate ligament     ACL right knee      Other and unspecified disc disorder of unspecified region     Intervertebral disc disorders       Current Medications -   Current Outpatient Medications:      aspirin 81 MG tablet, Take 1 tablet (81 mg) by mouth daily, Disp: 30 tablet, Rfl:      blood glucose (NO BRAND SPECIFIED) test strip, Use to test blood sugar 1-2 times daily or as directed., Disp: 50 each, Rfl: 11     blood glucose monitoring (CHIKI MICROLET) lancets, Use to test blood sugar 3 times daily or as directed., Disp: 1 each, Rfl: 11     blood glucose monitoring (NO BRAND SPECIFIED) test strip, Use to test blood sugar 3 times daily or as directed. To accompany: Blood Glucose Monitor Brands: per insurance., Disp: 100 strip, Rfl: 6     hydrochlorothiazide (HYDRODIURIL) 25 MG tablet, Take 1 tablet (25 mg) by mouth every morning, Disp: 90 tablet, Rfl: 3     hydrOXYzine (ATARAX) 25 MG tablet, Take 25 mg by mouth every 6 hours as needed for itching, Disp:  , Rfl:      Lactobacillus (PROBIOTIC ACIDOPHILUS PO), , Disp: , Rfl:      lisinopril (PRINIVIL/ZESTRIL) 40 MG tablet, Take 1 tablet (40 mg) by mouth daily, Disp: 90 tablet, Rfl: 3     metoprolol succinate ER (TOPROL-XL) 200 MG 24 hr tablet, Take 1 tablet (200 mg) by mouth daily, Disp: 90 tablet, Rfl: 3     Multiple Vitamin (DAILY MULTIVITAMIN PO), Take 1 tablet by mouth daily., Disp: , Rfl:      STATIN NOT PRESCRIBED, INTENTIONAL,, Statin not prescribed intentionally due to Other: not indicated due to extremely low LDL., Disp: , Rfl: 0     thin (NO BRAND SPECIFIED) lancets, Use to test blood sugar 3 times daily or as directed. To accompany: Blood Glucose Monitor Brands: per insurance., Disp: 200 each, Rfl: 1    Allergies -   Allergies   Allergen Reactions     No Known Drug Allergies        Social History -   Social History     Socioeconomic History     Marital status:      Spouse name: Not on file     Number of children: Not on file     Years of education: Not on file     Highest education level: Not on file   Occupational History     Employer: [employer]     Comment: / at [employer]   Social Needs     Financial resource strain: Not on file     Food insecurity:     Worry: Not on file     Inability: Not on file     Transportation needs:     Medical: Not on file     Non-medical: Not on file   Tobacco Use     Smoking status: Never Smoker     Smokeless tobacco: Never Used   Substance and Sexual Activity     Alcohol use: No     Drug use: No     Sexual activity: Yes     Partners: Female   Lifestyle     Physical activity:     Days per week: Not on file     Minutes per session: Not on file     Stress: Not on file   Relationships     Social connections:     Talks on phone: Not on file     Gets together: Not on file     Attends Christianity service: Not on file     Active member of club or organization: Not on file     Attends meetings of clubs or organizations: Not on file     Relationship status:  Not on file     Intimate partner violence:     Fear of current or ex partner: Not on file     Emotionally abused: Not on file     Physically abused: Not on file     Forced sexual activity: Not on file   Other Topics Concern      Service Not Asked     Blood Transfusions Not Asked     Caffeine Concern No     Occupational Exposure Not Asked     Hobby Hazards Not Asked     Sleep Concern Yes     Stress Concern Not Asked     Weight Concern Not Asked     Special Diet Not Asked     Back Care Not Asked     Exercise Not Asked     Bike Helmet Not Asked     Seat Belt Not Asked     Self-Exams Not Asked     Parent/sibling w/ CABG, MI or angioplasty before 65F 55M? Not Asked   Social History Narrative     Not on file       Family History -   Family History   Problem Relation Age of Onset     C.A.D. Father         snores     Diabetes Brother         x2     Hypertension Mother      Arthritis Mother      Prostate Cancer Maternal Grandfather        Review of Systems - As per HPI and PMHx, otherwise review of system review of the head and neck negative. Otherwise 10+ review of system is negative    Physical Exam  There were no vitals taken for this visit.  BMI: There is no height or weight on file to calculate BMI.    General - The patient is well nourished and well developed, and appears to have good nutritional status.  Alert and oriented to person and place, answers questions and cooperates with examination appropriately.    SKIN - No suspicious lesions or rashes.  Respiration - No respiratory distress.  Head and Face - Normocephalic and atraumatic, with no gross asymmetry noted of the contour of the facial features.  The facial nerve is intact, with strong symmetric movements.    Voice and Breathing - The patient was breathing comfortably without the use of accessory muscles. The patients voice was clear and strong, and had appropriate pitch and quality.    Ears - Bilateral pinna and EACs with normal appearing overlying  skin. Tympanic membrane intact with good mobility on pneumatic otoscopy bilaterally. Bony landmarks of the ossicular chain are normal. The tympanic membranes are normal in appearance. No retraction, perforation, or masses.  No fluid or purulence was seen in the external canal or the middle ear.     Eyes - Extraocular movements intact.  Sclera were not icteric or injected, conjunctiva were pink and moist.    Mouth - Examination of the oral cavity showed pink, healthy oral mucosa. No lesions or ulcerations noted.  The tongue was mobile and midline, and the dentition were in good condition.      Throat - The walls of the oropharynx were smooth, pink, moist, symmetric, and had no lesions or ulcerations.  The tonsillar pillars and soft palate were symmetric.  The uvula was midline on elevation.    Neck - Normal midline excursion of the laryngotracheal complex during swallowing.  Full range of motion on passive movement.  Palpation of the occipital, submental, submandibular, internal jugular chain, and supraclavicular nodes did not demonstrate any abnormal lymph nodes or masses.  The carotid pulse was palpable bilaterally.  Palpation of the thyroid was soft and smooth, with no nodules or goiter appreciated.  The trachea was mobile and midline.    Nose - External contour is symmetric, no gross deflection or scars.  Nasal mucosa is pink and moist with no abnormal mucus.  The septum was midline and non-obstructive, turbinates of normal size and position.  No polyps, masses, or purulence noted on examination.    Neuro - Nonfocal neuro exam is normal, CN 2 through 12 intact, normal gait and muscle tone.      Performed in clinic today:  Audiologic Studies - An audiogram and tympanogram were performed today as part of the evaluation and personally reviewed. The tympanogram shows Type a curves on the right and Type a curves on the left, with Normal canal volumes and middle ear pressures.  The audiogram showed Very mild  high-frequency SNHL on the right and Very mild high-frequency SNHLon the left.  Excellent word recognition score 100% on the right versus 96% on the left.      A/P - Barbie Portillo is a 63 year old male with a bilateral tinnitus and a very mild high-frequency loss reflective of that.  We discussed tinnitus coping strategies avoidance of caffeine salt salicylates.  We discussed white noise introduction.  Patient understands he can cope quite well with it and will continue masking it with white noise which seems to help recheck the hearing in the year or year and a half.      Pelon Wagner MD    Again, thank you for allowing me to participate in the care of your patient.        Sincerely,        Pelon Wagner MD, MD

## 2019-07-29 NOTE — PROGRESS NOTES
AUDIOLOGY REPORT     SUMMARY: Audiology visit completed. See audiogram for results.     RECOMMENDATIONS: Follow-up with ENT    Pacheco Dash Licensed Audiologist #[id number]

## 2020-01-27 ENCOUNTER — HOSPITAL ENCOUNTER (OUTPATIENT)
Dept: GENERAL RADIOLOGY | Facility: CLINIC | Age: 65
Discharge: HOME | End: 2020-01-27
Attending: FAMILY MEDICINE | Admitting: FAMILY MEDICINE
Payer: COMMERCIAL

## 2020-01-27 ENCOUNTER — OFFICE VISIT (OUTPATIENT)
Dept: FAMILY MEDICINE | Facility: CLINIC | Age: 65
End: 2020-01-27
Payer: COMMERCIAL

## 2020-01-27 VITALS
HEART RATE: 74 BPM | HEIGHT: 72 IN | TEMPERATURE: 98.8 F | WEIGHT: 253.2 LBS | SYSTOLIC BLOOD PRESSURE: 138 MMHG | RESPIRATION RATE: 16 BRPM | BODY MASS INDEX: 34.29 KG/M2 | DIASTOLIC BLOOD PRESSURE: 90 MMHG | OXYGEN SATURATION: 96 %

## 2020-01-27 DIAGNOSIS — M25.552 HIP PAIN, LEFT: ICD-10-CM

## 2020-01-27 DIAGNOSIS — M25.652 DECREASED RANGE OF LEFT HIP MOVEMENT: ICD-10-CM

## 2020-01-27 DIAGNOSIS — E11.9 TYPE 2 DIABETES MELLITUS WITHOUT COMPLICATION, WITHOUT LONG-TERM CURRENT USE OF INSULIN (H): ICD-10-CM

## 2020-01-27 DIAGNOSIS — M16.12 PRIMARY OSTEOARTHRITIS OF LEFT HIP: Primary | ICD-10-CM

## 2020-01-27 PROCEDURE — 99213 OFFICE O/P EST LOW 20 MIN: CPT | Performed by: FAMILY MEDICINE

## 2020-01-27 PROCEDURE — 73502 X-RAY EXAM HIP UNI 2-3 VIEWS: CPT | Mod: TC

## 2020-01-27 ASSESSMENT — PAIN SCALES - GENERAL: PAINLEVEL: EXTREME PAIN (8)

## 2020-01-27 ASSESSMENT — MIFFLIN-ST. JEOR: SCORE: 1968.57

## 2020-01-27 NOTE — PROGRESS NOTES
"Subjective     Barbie Portillo is a 64 year old male who presents to clinic today for the following health issues:    HPI   Musculoskeletal problem/pain      Duration: 2 months    Description  Location: Left Hip    Intensity:  8/10    Accompanying signs and symptoms: radiation of pain down leg into the calf    History  Previous similar problem: no   Previous evaluation:  none    Precipitating or alleviating factors:  Trauma or overuse: YES  Aggravating factors include: sitting, and walking up or down stairs    Therapies tried and outcome: heat and chiropractor-thinks pulled muscle          Left hip pain ongoing for 2 months, worse with range of motion activity.  Pain with both external and internal rotation.  Noted along the groin and deep into hip.  Mild left sided low back pain.  No known traumatic injury.  Slight injury noted when he slipped while out in the woods.  Pain ever since.  No other severe lower extremity joint pain symptoms.      Left leg pain radiates along lateral leg and to posterior calf.  No foot pain.  No left knee pain.  No numbness/tingling.    Blood sugars are in the 130s (high for him).    Patient reports no abdominal pain, change in bowel/bladder function, or saddle anesthesia.     Reviewed and updated as needed this visit by Provider  Tobacco  Allergies  Meds  Problems  Med Hx  Surg Hx  Fam Hx         Review of Systems   ROS COMP: Constitutional, HEENT, cardiovascular, pulmonary, GI, , musculoskeletal, neuro, skin, endocrine and psych systems are negative, except as otherwise noted.      Objective    BP (!) 138/90 (BP Location: Right arm, Patient Position: Chair, Cuff Size: Adult Large)   Pulse 74   Temp 98.8  F (37.1  C) (Temporal)   Resp 16   Ht 1.816 m (5' 11.5\")   Wt 114.9 kg (253 lb 3.2 oz)   SpO2 96%   BMI 34.82 kg/m    Body mass index is 34.82 kg/m .  Physical Exam   Right Hip Exam   Right hip exam is normal.       Left Hip Exam     Tenderness   The patient is " "experiencing no tenderness.     Range of Motion   Flexion: normal   External rotation:  40 abnormal   Internal rotation: 10 abnormal     Muscle Strength   Flexion: 4/5     Tests   ASHWINI: positive    Other   Sensation: normal           Diagnostic Test Results:  Labs reviewed in Epic  X-ray of left hip:                                                                  IMPRESSION:  1. Severe degenerative changes of the left hip have significantly  progressed since the prior study dated 12/15/2007.  2. Severe degenerative changes of the visualized portions of the  lumbar spine.  These are only partially imaged and were seen on the  prior study.  3. No acute fracture or malalignment.          Assessment & Plan     ASSESSMENT/ORDERS:    ICD-10-CM    1. Primary osteoarthritis of left hip M16.12    2. Hip pain, left M25.552 ORTHOPEDICS ADULT REFERRAL     CANCELED: XR Hip Left 2-3 Views   3. Decreased range of left hip movement M25.652 CANCELED: XR Hip Left 2-3 Views   4. Type 2 diabetes mellitus without complication, without long-term current use of insulin (H) E11.9      PLAN:  1.  Recommended patient be seen by orthopedic surgeon for evaluation of his hip and discussion on surgical options. Based on exam, level of pain, and x-ray, he would benefit from consideration of total hip arthroplasty.     BMI:   Estimated body mass index is 34.82 kg/m  as calculated from the following:    Height as of this encounter: 1.816 m (5' 11.5\").    Weight as of this encounter: 114.9 kg (253 lb 3.2 oz).               Return in about 4 weeks (around 2/24/2020) for recheck if symptoms worsen or fail to improve.    Aric Stephens MD  [facility]  "

## 2020-01-28 DIAGNOSIS — M16.12 PRIMARY OSTEOARTHRITIS OF LEFT HIP: Primary | ICD-10-CM

## 2020-01-28 NOTE — TELEPHONE ENCOUNTER
I left a message asking patient to return our call.  Please inform patient of the message below.  Jerry Lisa, CMA

## 2020-01-28 NOTE — TELEPHONE ENCOUNTER
I realized after patient's appointment that I did not offer anything as an alternative to ibuprofen for left hip pain while he awaits his consult.      Meloxicam is a once daily ibuprofen equivalent that may be less harsh on his stomach.  He can take 1/2 to 1 full tab/day.  May take with or without acetaminophen at the same time.  If he wants prescription I can send it to the pharmacy of his choice.    Will have staff notify patient.    Aric Stephens MD

## 2020-01-28 NOTE — LETTER
[address] [id number] [phone]        January 29, 2020    Barbie Portillo  [id number] [facility] [address]          Dear Barbie,    We have attempted to reach you by phone in regards to your care. I realized after patient's appointment that I did not offer anything as an alternative to ibuprofen for left hip pain while he awaits his consult.       Meloxicam is a once daily ibuprofen equivalent that may be less harsh on his stomach.  He can take 1/2 to 1 full tab/day.  May take with or without acetaminophen at the same time.  If he wants prescription I can send it to the pharmacy of his choice.     Will have staff notify patient.     Aric Stephens MD     Sincerely,

## 2020-01-29 ENCOUNTER — TELEPHONE (OUTPATIENT)
Dept: FAMILY MEDICINE | Facility: CLINIC | Age: 65
End: 2020-01-29

## 2020-01-29 NOTE — TELEPHONE ENCOUNTER
2nd attempt, called and LM for patient to call back. Please relay message below to patient. Letter sent to patient as well.     Routing to pcp as FYI unable to reach patient.     Liudmila Bond MA

## 2020-01-29 NOTE — TELEPHONE ENCOUNTER
Pt informed. Please send to [facility]. Patient is aware that PCP is not in the office and is ok with waiting for their return before hearing anything back.     Thank you,  Yvette Chilel- Patient Representative

## 2020-01-29 NOTE — TELEPHONE ENCOUNTER
Patient is scheduled for an appointment with Dr. Das for further evaluation of his left hip pain on Wednesday, February 5th at 2:00 pm. Patient needs to be notified between 1 and 3 pm today due to working hours. Jackelin Gee LPN

## 2020-01-30 PROBLEM — M16.12 PRIMARY OSTEOARTHRITIS OF LEFT HIP: Status: ACTIVE | Noted: 2020-01-30

## 2020-01-30 RX ORDER — MELOXICAM 15 MG/1
TABLET ORAL
Qty: 90 TABLET | Refills: 0 | Status: SHIPPED | OUTPATIENT
Start: 2020-01-30 | End: 2020-07-17

## 2020-02-05 ENCOUNTER — OFFICE VISIT (OUTPATIENT)
Dept: ORTHOPEDICS | Facility: CLINIC | Age: 65
End: 2020-02-05
Payer: COMMERCIAL

## 2020-02-05 ENCOUNTER — ANCILLARY PROCEDURE (OUTPATIENT)
Dept: GENERAL RADIOLOGY | Facility: CLINIC | Age: 65
End: 2020-02-05
Attending: ORTHOPAEDIC SURGERY
Payer: COMMERCIAL

## 2020-02-05 VITALS
BODY MASS INDEX: 34.12 KG/M2 | WEIGHT: 251.9 LBS | DIASTOLIC BLOOD PRESSURE: 90 MMHG | SYSTOLIC BLOOD PRESSURE: 170 MMHG | HEIGHT: 72 IN

## 2020-02-05 DIAGNOSIS — M16.12 PRIMARY OSTEOARTHRITIS OF LEFT HIP: Primary | ICD-10-CM

## 2020-02-05 DIAGNOSIS — M25.552 LEFT HIP PAIN: ICD-10-CM

## 2020-02-05 PROCEDURE — 99243 OFF/OP CNSLTJ NEW/EST LOW 30: CPT | Performed by: ORTHOPAEDIC SURGERY

## 2020-02-05 PROCEDURE — 72170 X-RAY EXAM OF PELVIS: CPT | Mod: TC

## 2020-02-05 ASSESSMENT — MIFFLIN-ST. JEOR: SCORE: 1966.64

## 2020-02-05 NOTE — PROGRESS NOTES
ORTHOPEDIC CONSULT      Chief Complaint: Barbie Portillo is a 64 year old male who is being seen for   Chief Complaint   Patient presents with     Musculoskeletal Problem     left hip pain     Consult     ref: Dr. Stephens       History of Present Illness:   Barbie Portillo is a 64 year old male who is seen in consultation at the request of Dr Stephens for evaluation of left hip pain.  Presents with groin and posterior buttock pain deep inside since November 2019.  No specific injuries or traumas.  Describes it as aching.  It is progressively worsened.  Causes him limp at times.  He has pain when he sits prolonged periods of time.  Occasionally shoots down to the ankle after a long day however this is rare.  No peripheral numbness and tingling.  Rates it as moderate.          Patient's past medical, surgical, social and family histories reviewed.     Past Medical History:   Diagnosis Date     Essential hypertension, benign 2006    Hypertension, Benign     Obesity, unspecified      Old disruption of anterior cruciate ligament     ACL right knee      Other and unspecified disc disorder of unspecified region     Intervertebral disc disorders       Past Surgical History:   Procedure Laterality Date     ANKLE SURGERY  2011    right      COLONOSCOPY  10/22/2007     COLONOSCOPY N/A 1/15/2019    Procedure: COLONOSCOPY;  Surgeon: Yusef Garcia MD;  Location:  GI     SURGICAL HISTORY OF -   04/04/84    Bilateral testicular biopsy       Medications:  meloxicam (MOBIC) 15 MG tablet, Take 0.5-1 tabs daily  aspirin 81 MG tablet, Take 1 tablet (81 mg) by mouth daily  blood glucose (NO BRAND SPECIFIED) test strip, Use to test blood sugar 1-2 times daily or as directed.  blood glucose monitoring (CHIKI MICROLET) lancets, Use to test blood sugar 3 times daily or as directed.  blood glucose monitoring (NO BRAND SPECIFIED) test strip, Use to test blood sugar 3 times daily or as directed. To accompany: Blood Glucose Monitor  "Brands: per insurance.  hydrochlorothiazide (HYDRODIURIL) 25 MG tablet, Take 1 tablet (25 mg) by mouth every morning  hydrOXYzine (ATARAX) 25 MG tablet, Take 25 mg by mouth every 6 hours as needed for itching  Lactobacillus (PROBIOTIC ACIDOPHILUS PO),   lisinopril (PRINIVIL/ZESTRIL) 40 MG tablet, Take 1 tablet (40 mg) by mouth daily  metoprolol succinate ER (TOPROL-XL) 200 MG 24 hr tablet, Take 1 tablet (200 mg) by mouth daily  Multiple Vitamin (DAILY MULTIVITAMIN PO), Take 1 tablet by mouth daily.  STATIN NOT PRESCRIBED, INTENTIONAL,, Statin not prescribed intentionally due to Other: not indicated due to extremely low LDL.  thin (NO BRAND SPECIFIED) lancets, Use to test blood sugar 3 times daily or as directed. To accompany: Blood Glucose Monitor Brands: per insurance.    No current facility-administered medications on file prior to visit.       Allergies   Allergen Reactions     No Known Drug Allergies        Social History     Occupational History     Employer: [employer]     Comment: / at [employer]   Tobacco Use     Smoking status: Never Smoker     Smokeless tobacco: Never Used   Substance and Sexual Activity     Alcohol use: No     Drug use: No     Sexual activity: Yes     Partners: Female       Family History   Problem Relation Age of Onset     C.A.D. Father         snores     Diabetes Brother         x2     Hypertension Mother      Arthritis Mother      Prostate Cancer Maternal Grandfather        REVIEW OF SYSTEMS  10 point review systems performed otherwise negative as noted as per history of present illness.    Physical Exam:  Vitals: BP (!) 170/90   Ht 1.822 m (5' 11.75\")   Wt 114.3 kg (251 lb 14.4 oz)   BMI 34.40 kg/m    BMI= Body mass index is 34.4 kg/m .  Constitutional: healthy, alert and no acute distress   Psychiatric: mentation appears normal and affect normal/bright  NEURO: no focal deficits  RESP: Normal with easy respirations and no use of accessory muscles to breathe, " no audible wheezing or retractions  CV: LLE:  no edema         Regular rate and rhythm by palpation  SKIN: No erythema, rashes, excoriation, or breakdown. No evidence of infection.   JOINT/EXTREMITIES:left hip: No gross deformity.  No focal areas of tenderness.  Fairly good strength throughout.  Good motion with flexion and internal and external rotation.  Negative straight leg.  Minimal discomfort with terminal flexion internal rotation.   Lymph: No peripheral lymphedema  GAIT: non-antalgic    Diagnostic Modalities:  left hip X-ray: with bone on bone wear  Independent visualization of the images was performed.      Impression: left hip primary osteoarthritis    Plan:  All of the above pertinent physical exam and imaging modalities findings was reviewed with Barbie and his wife.    Options discussed.  He reports he recently started meloxicam which has been extremely helpful.  Exam wise really minimal pain and issues.  Recommend adding some physical therapy.  Also discussed weight loss.  If pain were to return or this starts to impact the quality of life despite conservative care would discuss proceeding with hip replacement surgery.        Return to clinic 4-6 , week(s), PRN, or sooner as needed for changes.  Re-x-ray on return: No    Yoandy Das D.O.

## 2020-02-05 NOTE — PROGRESS NOTES
Barbie to follow up with Primary Care provider regarding elevated blood pressure.patient states he has white coat syndrome.

## 2020-02-05 NOTE — LETTER
2/5/2020         RE: Barbie Portillo  [address] [facility] [address]        Dear Colleague,    Thank you for referring your patient, Barbie Portillo, to the [facility]. Please see a copy of my visit note below.    Barbie to follow up with Primary Care provider regarding elevated blood pressure.patient states he has white coat syndrome.     ORTHOPEDIC CONSULT      Chief Complaint: Barbie Portillo is a 64 year old male who is being seen for   Chief Complaint   Patient presents with     Musculoskeletal Problem     left hip pain     Consult     ref: Dr. Stephens       History of Present Illness:   Barbie Portillo is a 64 year old male who is seen in consultation at the request of Dr Stephens for evaluation of left hip pain.  Presents with groin and posterior buttock pain deep inside since November 2019.  No specific injuries or traumas.  Describes it as aching.  It is progressively worsened.  Causes him limp at times.  He has pain when he sits prolonged periods of time.  Occasionally shoots down to the ankle after a long day however this is rare.  No peripheral numbness and tingling.  Rates it as moderate.          Patient's past medical, surgical, social and family histories reviewed.     Past Medical History:   Diagnosis Date     Essential hypertension, benign 2006    Hypertension, Benign     Obesity, unspecified      Old disruption of anterior cruciate ligament     ACL right knee      Other and unspecified disc disorder of unspecified region     Intervertebral disc disorders       Past Surgical History:   Procedure Laterality Date     ANKLE SURGERY  2011    right      COLONOSCOPY  10/22/2007     COLONOSCOPY N/A 1/15/2019    Procedure: COLONOSCOPY;  Surgeon: Yusef Garcia MD;  Location:  GI     SURGICAL HISTORY OF -   04[birth year]04[birth year]    Bilateral testicular biopsy       Medications:  meloxicam (MOBIC) 15 MG tablet, Take 0.5-1 tabs daily  aspirin 81 MG tablet, Take 1 tablet (81 mg)  by mouth daily  blood glucose (NO BRAND SPECIFIED) test strip, Use to test blood sugar 1-2 times daily or as directed.  blood glucose monitoring (CHIKI MICROLET) lancets, Use to test blood sugar 3 times daily or as directed.  blood glucose monitoring (NO BRAND SPECIFIED) test strip, Use to test blood sugar 3 times daily or as directed. To accompany: Blood Glucose Monitor Brands: per insurance.  hydrochlorothiazide (HYDRODIURIL) 25 MG tablet, Take 1 tablet (25 mg) by mouth every morning  hydrOXYzine (ATARAX) 25 MG tablet, Take 25 mg by mouth every 6 hours as needed for itching  Lactobacillus (PROBIOTIC ACIDOPHILUS PO),   lisinopril (PRINIVIL/ZESTRIL) 40 MG tablet, Take 1 tablet (40 mg) by mouth daily  metoprolol succinate ER (TOPROL-XL) 200 MG 24 hr tablet, Take 1 tablet (200 mg) by mouth daily  Multiple Vitamin (DAILY MULTIVITAMIN PO), Take 1 tablet by mouth daily.  STATIN NOT PRESCRIBED, INTENTIONAL,, Statin not prescribed intentionally due to Other: not indicated due to extremely low LDL.  thin (NO BRAND SPECIFIED) lancets, Use to test blood sugar 3 times daily or as directed. To accompany: Blood Glucose Monitor Brands: per insurance.    No current facility-administered medications on file prior to visit.       Allergies   Allergen Reactions     No Known Drug Allergies        Social History     Occupational History     Employer: [employer]     Comment: / at [employer]   Tobacco Use     Smoking status: Never Smoker     Smokeless tobacco: Never Used   Substance and Sexual Activity     Alcohol use: No     Drug use: No     Sexual activity: Yes     Partners: Female       Family History   Problem Relation Age of Onset     C.A.D. Father         snores     Diabetes Brother         x2     Hypertension Mother      Arthritis Mother      Prostate Cancer Maternal Grandfather        REVIEW OF SYSTEMS  10 point review systems performed otherwise negative as noted as per history of present  "illness.    Physical Exam:  Vitals: BP (!) 170/90   Ht 1.822 m (5' 11.75\")   Wt 114.3 kg (251 lb 14.4 oz)   BMI 34.40 kg/m     BMI= Body mass index is 34.4 kg/m .  Constitutional: healthy, alert and no acute distress   Psychiatric: mentation appears normal and affect normal/bright  NEURO: no focal deficits  RESP: Normal with easy respirations and no use of accessory muscles to breathe, no audible wheezing or retractions  CV: LLE:  no edema         Regular rate and rhythm by palpation  SKIN: No erythema, rashes, excoriation, or breakdown. No evidence of infection.   JOINT/EXTREMITIES:left hip: No gross deformity.  No focal areas of tenderness.  Fairly good strength throughout.  Good motion with flexion and internal and external rotation.  Negative straight leg.  Minimal discomfort with terminal flexion internal rotation.   Lymph: No peripheral lymphedema  GAIT: non-antalgic    Diagnostic Modalities:  left hip X-ray: with bone on bone wear  Independent visualization of the images was performed.      Impression: left hip primary osteoarthritis    Plan:  All of the above pertinent physical exam and imaging modalities findings was reviewed with Barbie and his wife.    Options discussed.  He reports he recently started meloxicam which has been extremely helpful.  Exam wise really minimal pain and issues.  Recommend adding some physical therapy.  Also discussed weight loss.  If pain were to return or this starts to impact the quality of life despite conservative care would discuss proceeding with hip replacement surgery.        Return to clinic 4-6 , week(s), PRN, or sooner as needed for changes.  Re-x-ray on return: No    Yoandy Das D.O.    Again, thank you for allowing me to participate in the care of your patient.        Sincerely,        Bob Das, DO    "

## 2020-02-17 ENCOUNTER — HOSPITAL ENCOUNTER (OUTPATIENT)
Dept: PHYSICAL THERAPY | Facility: CLINIC | Age: 65
Setting detail: THERAPIES SERIES
End: 2020-02-17
Attending: ORTHOPAEDIC SURGERY
Payer: COMMERCIAL

## 2020-02-17 DIAGNOSIS — M16.12 PRIMARY OSTEOARTHRITIS OF LEFT HIP: ICD-10-CM

## 2020-02-17 PROCEDURE — 97162 PT EVAL MOD COMPLEX 30 MIN: CPT | Mod: GP | Performed by: PHYSICAL THERAPIST

## 2020-02-17 PROCEDURE — 97110 THERAPEUTIC EXERCISES: CPT | Mod: GP | Performed by: PHYSICAL THERAPIST

## 2020-02-17 NOTE — PROGRESS NOTES
02/17/20 1310   General Information   Type of Visit Initial OP Ortho PT Evaluation   Start of Care Date 02/17/20   Referring Physician Dr. Bob Das   Patient/Family Goals Statement Get rid of the pain!   Orders Evaluate and Treat   Orders Comment Core, HS, quad, hip flexor.  Home program.  Begin with flexibility and move to endurance.   Date of Order 02/05/20   Medical Diagnosis Primary osteoarthritis of left hip   Surgical/Medical history reviewed Yes   Precautions/Limitations no known precautions/limitations   Weight-Bearing Status - LLE full weight-bearing   Weight-Bearing Status - RLE full weight-bearing   General Information Comments PMH: R ACL, ankle surgery R, DDD of lumbar spine, L foot surgery (fully restructive) 1/4/2019, diabetes managed with diet and exercise.       Present No   Body Part(s)   Body Part(s) Hip   Presentation and Etiology   Pertinent history of current problem (include personal factors and/or comorbidities that impact the POC) About 11/2019, was deer hunting and stepped wrong in the woods.  He states that he noticed increasing symptoms in the hip.  Does get a little more soreness in the hip and knee.  Pt states having full reconstruction on the L foot in 1/4/2019 and was out for 7 months.  Pt reports having custom orthotics in the shoes.   Summer time very active wood working, swimming a lot in lake, walking in the woods, and riding bike 3-4x/week 10 miles at a time.  In the winter he does the stationary bike a couple times a week for 30 mins at a time.  Meloxicam does help get the pain to go away.  Sitting too long makes him stiff.  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  Since than (30+ years) he has not had full extension or bending of the R knee.   Impairments A. Pain   Functional Limitations perform activities of daily living;perform required work  activities;perform desired leisure / sports activities   Symptom Location Posterior hip and groin region on L side.   How/Where did it occur From insidious onset   Onset date of current episode/exacerbation 11/07/19  (Around deer season in November)   Chronicity New   Pain rating (0-10 point scale) Best (/10);Worst (/10)   Best (/10) 0/10   Worst (/10) 5/10   Pain quality B. Dull;C. Aching  (Throbbing)   Frequency of pain/symptoms B. Intermittent   Pain/symptoms are:   (During his work day)   Pain/symptoms exacerbated by A. Sitting;B. Walking;C. Lifting;K. Home tasks;L. Work tasks   Pain/symptoms eased by C. Rest  (Meloxicam, icy/hot)   Progression of symptoms since onset: Improved   Current / Previous Interventions   Diagnostic Tests: X-ray   X-ray Results Results   X-ray results Significant degenerative changes to the L hip.    Prior Level of Function   Prior Level of Function-Mobility Independent   Prior Level of Function-ADLs Independent   Current Level of Function   Current Community Support Family/friend caregiver   Patient role/employment history A. Employed   Employment Comments ,    Living environment [facility]   Home/community accessibility 16 steps up and down with split level home.  5 steps to get into the home.   Current equipment-Gait/Locomotion None   Current equipment-ADL None   Fall Risk Screen   Fall screen completed by PT   Have you fallen 2 or more times in the past year? No   Have you fallen and had an injury in the past year? No   Is patient a fall risk? No   Functional Scales   Functional Scales Other   Other Scales  LEFS   Hip Objective Findings   Side (if bilateral, select both right and left) Left;Right   Observation No acute distress   Posture Forward head, rounded shoulders.  Stands with slight flexion of the R knee.   Gait/Locomotion Antalgic gait with slight limp on L   Balance/Proprioception (Single Leg Stance) 20 secs R side with UE support and ankle  strategy.  10 secs L side with UE support and more hip and knee strategy.   Hip ROM Comments R knee AROM is lacking 5 degs extension from straight.   Lumbar ROM Slight decreased in lumbar extension.   Functional Closed Chain Screen Unable to perform proper squat without poor mechanics, weight shift to the L, and decreased ankle DF.   Hip/Knee Strength Comments Pt demonstrates significant compensation patterns with MMT.  He will fire gluteal region for knee extension testing, low back for knee flexion and hip extension testing, and hip flexors for hip abduction testing.   Gluteal Tendopathy Test Negative   Straight Leg Raise Test Negative   Scour Test Positive L   ASHWINI Test Positive L   FADIR Test Positive L   Neurological Testing Comments Normal   Palpation Tenderness along the hip flexors, gluteus medius, and SI joint line on L.   Accessory Motion/Joint Mobility Decreased anterior mobility of L hip.   Left Hip Flexion PROM WFL   Left Hip Abduction PROM WFL   Left Hip ER PROM WFL   Left Hip IR PROM Limited   Left Hip Flexion Strength 3/5   Left Hip Abduction Strength 3/5   Left Hip Extension Strength 3/5   Left Knee Flexion Strength 5/5   Left Knee Extension Strength 4+/5   Gustavo Flexibility Test Decreased L > R    Left Hamstring Flexibility Normal   Right Hip Flexion PROM WFL   Right Hip Abduction PROM WFL   Right Hip ER PROM WFL   Right Hip IR PROM WFL   Right Hip Flexion Strength 4/5   Right Hip Abduction Strength 3/5   Right Hip Extension Strength 3/5   Right Knee Flexion Strength 5/5   Right Knee Extension Strength 4/5   Right Hamstring Flexibility Decreased flexibility, not able to full straighten R knee   Planned Therapy Interventions   Planned Therapy Interventions balance training;gait training;joint mobilization;manual therapy;neuromuscular re-education;ROM;strengthening;stretching   Clinical Impression   Criteria for Skilled Therapeutic Interventions Met yes, treatment indicated   PT Diagnosis Pain,  decreased joint mobility, weakness, compensation patterns with functional activities.   Influenced by the following impairments Pain   Functional limitations due to impairments Sitting too long, walking too far, work duties.   Clinical Presentation Stable/Uncomplicated   Clinical Presentation Rationale Pt is a 64 year old with complaints of L hip pain.  Imaging shows signficant degenerative changes in the hip joint, however pt has improved symptoms with recent medication from MD.  Pt would like to avoid surgery at this time and improve symptoms.  Pt has PMH of B ankle/foot surgeries and R ACL detachment.  These issues have resulted in pt demonstrating significant compensation patterns and weakness through core and LE musculature.  Pt would benefit from skilled PT services to address weakness, decreased flexibility, and poor functional movement in order to improve overall mobility.   Pt will struggle with walking too far, sitting too long, and work duties.   Clinical Decision Making (Complexity) Moderate complexity   Therapy Frequency 1 time/week   Predicted Duration of Therapy Intervention (days/wks) 8 weeks   Risk & Benefits of therapy have been explained Yes   Patient, Family & other staff in agreement with plan of care Yes   Education Assessment   Preferred Learning Style Listening;Demonstration;Pictures/video   Barriers to Learning No barriers   ORTHO GOALS   PT Ortho Eval Goals 1;2;3   Ortho Goal 1   Goal Identifier #1   Goal Description Pt will demonstrate 4/5 MMT testing without compensation patterns for hip and knee motions in order to progress to more functional movement patterns with work and home activities.   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   Target Date 04/02/20   Ortho Goal 3   Goal Identifier #3   Goal Description Pt  will demonstrate ability to perform 10 squats with proper mechanics and no pain in knees or hips in order to demonstrate proper functional mobility.   Target Date 04/17/20   Total Evaluation Time   PT Eval, Moderate Complexity Minutes (18067) 30     Thank you for your referral.    Gracie Naranjo, PT, DPT  [facility]  [phone]

## 2020-02-26 ENCOUNTER — HOSPITAL ENCOUNTER (OUTPATIENT)
Dept: PHYSICAL THERAPY | Facility: CLINIC | Age: 65
Setting detail: THERAPIES SERIES
End: 2020-02-26
Attending: ORTHOPAEDIC SURGERY
Payer: COMMERCIAL

## 2020-02-26 PROCEDURE — 97140 MANUAL THERAPY 1/> REGIONS: CPT | Mod: GP | Performed by: PHYSICAL THERAPIST

## 2020-02-26 PROCEDURE — 97110 THERAPEUTIC EXERCISES: CPT | Mod: GP | Performed by: PHYSICAL THERAPIST

## 2020-03-04 ENCOUNTER — HOSPITAL ENCOUNTER (OUTPATIENT)
Dept: PHYSICAL THERAPY | Facility: CLINIC | Age: 65
Setting detail: THERAPIES SERIES
End: 2020-03-04
Attending: ORTHOPAEDIC SURGERY
Payer: COMMERCIAL

## 2020-03-04 PROCEDURE — 97140 MANUAL THERAPY 1/> REGIONS: CPT | Mod: GP | Performed by: PHYSICAL THERAPIST

## 2020-03-04 PROCEDURE — 97110 THERAPEUTIC EXERCISES: CPT | Mod: GP | Performed by: PHYSICAL THERAPIST

## 2020-03-09 ENCOUNTER — HOSPITAL ENCOUNTER (OUTPATIENT)
Dept: PHYSICAL THERAPY | Facility: CLINIC | Age: 65
Setting detail: THERAPIES SERIES
End: 2020-03-09
Attending: ORTHOPAEDIC SURGERY
Payer: COMMERCIAL

## 2020-03-09 PROCEDURE — 97140 MANUAL THERAPY 1/> REGIONS: CPT | Mod: GP | Performed by: PHYSICAL THERAPIST

## 2020-03-16 ENCOUNTER — HOSPITAL ENCOUNTER (OUTPATIENT)
Dept: PHYSICAL THERAPY | Facility: CLINIC | Age: 65
Setting detail: THERAPIES SERIES
End: 2020-03-16
Attending: ORTHOPAEDIC SURGERY
Payer: COMMERCIAL

## 2020-03-16 PROCEDURE — 97110 THERAPEUTIC EXERCISES: CPT | Mod: GP | Performed by: PHYSICAL THERAPIST

## 2020-03-16 PROCEDURE — 97140 MANUAL THERAPY 1/> REGIONS: CPT | Mod: GP | Performed by: PHYSICAL THERAPIST

## 2020-03-18 ENCOUNTER — TELEPHONE (OUTPATIENT)
Dept: PHYSICAL THERAPY | Facility: CLINIC | Age: 65
End: 2020-03-18

## 2020-03-18 NOTE — TELEPHONE ENCOUNTER
PT contacted pt in regards to her upcoming appointments the week of March 16 and March 23.  Due to the COVID-19 restrictions for rehab, PT has cancelled pt's appointments for this week and next week.  Pt will be contacted next week by PT in order to reschedule appointments.  PT gave pt guidance on continuing with home program at this time.     Gracie Naranjo, PT, DPT

## 2020-07-07 ENCOUNTER — APPOINTMENT (OUTPATIENT)
Dept: GENERAL RADIOLOGY | Facility: CLINIC | Age: 65
End: 2020-07-07
Attending: PHYSICIAN ASSISTANT
Payer: COMMERCIAL

## 2020-07-07 ENCOUNTER — HOSPITAL ENCOUNTER (EMERGENCY)
Facility: CLINIC | Age: 65
Discharge: HOME | End: 2020-07-07
Attending: PHYSICIAN ASSISTANT | Admitting: PHYSICIAN ASSISTANT
Payer: COMMERCIAL

## 2020-07-07 VITALS
RESPIRATION RATE: 18 BRPM | OXYGEN SATURATION: 98 % | TEMPERATURE: 98.8 F | SYSTOLIC BLOOD PRESSURE: 181 MMHG | DIASTOLIC BLOOD PRESSURE: 101 MMHG | BODY MASS INDEX: 33.32 KG/M2 | WEIGHT: 244 LBS | HEART RATE: 57 BPM
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
## 2020-07-07 DIAGNOSIS — M62.830 SPASM OF MUSCLE OF LOWER BACK: ICD-10-CM

## 2020-07-07 PROCEDURE — 72100 X-RAY EXAM L-S SPINE 2/3 VWS: CPT | Mod: TC

## 2020-07-07 PROCEDURE — 99284 EMERGENCY DEPT VISIT MOD MDM: CPT | Performed by: PHYSICIAN ASSISTANT

## 2020-07-07 PROCEDURE — 99284 EMERGENCY DEPT VISIT MOD MDM: CPT | Mod: Z6 | Performed by: PHYSICIAN ASSISTANT

## 2020-07-07 PROCEDURE — 72072 X-RAY EXAM THORAC SPINE 3VWS: CPT | Mod: TC

## 2020-07-07 PROCEDURE — 25000132 ZZH RX MED GY IP 250 OP 250 PS 637: Performed by: PHYSICIAN ASSISTANT

## 2020-07-07 RX ORDER — CYCLOBENZAPRINE HCL 10 MG
10 TABLET ORAL 3 TIMES DAILY PRN
Qty: 12 TABLET | Refills: 0 | Status: SHIPPED | OUTPATIENT
Start: 2020-07-07 | End: 2020-07-17

## 2020-07-07 RX ORDER — HYDROCODONE BITARTRATE AND ACETAMINOPHEN 5; 325 MG/1; MG/1
1 TABLET ORAL EVERY 6 HOURS PRN
Qty: 10 TABLET | Refills: 0 | Status: SHIPPED | OUTPATIENT
Start: 2020-07-07 | End: 2020-07-17

## 2020-07-07 RX ORDER — LIDOCAINE 4 G/G
2 PATCH TOPICAL ONCE
Status: DISCONTINUED | OUTPATIENT
Start: 2020-07-07 | End: 2020-07-07 | Stop reason: HOSPADM

## 2020-07-07 RX ADMIN — LIDOCAINE 2 PATCH: 560 PATCH PERCUTANEOUS; TOPICAL; TRANSDERMAL at 14:59

## 2020-07-07 NOTE — ED TRIAGE NOTES
"Pt presents with right sided low back \"burning\" pain after fall onto buttocks on Friday. Did not hit head, no LOC.  "

## 2020-07-07 NOTE — ED AVS SNAPSHOT
[facility] Emergency Department  [address] [place] [address]  Phone:  [phone]  Fax:  [phone]                                    Barbie Portillo   MRN: [id number]    Department:  [facility] Emergency Department   Date of Visit:  7/7/2020           After Visit Summary Signature Page    I have received my discharge instructions, and my questions have been answered. I have discussed any challenges I see with this plan with the nurse or doctor.    ..........................................................................................................................................  Patient/Patient Representative Signature      ..........................................................................................................................................  Patient Representative Print Name and Relationship to Patient    ..................................................               ................................................  Date                                   Time    ..........................................................................................................................................  Reviewed by Signature/Title    ...................................................              ..............................................  Date                                               Time          22EPIC Rev 08/18

## 2020-07-07 NOTE — DISCHARGE INSTRUCTIONS
"It was a pleasure working with you today!  I hope your condition improves rapidly!     Thankfully, your x-rays did not show any sign of fracture.  You do have significant muscle spasm on the side of your low back causing her symptoms.    The Lidoderm patch can stay on for up to 18 hours.  If this works well for you, you can purchase these over-the-counter.    Use heat on the back for 20 minutes every couple hours to help with the muscle spasm and discomfort.  Ibuprofen 600 mg can be taken every 6 hours with food as needed for inflammation and pain.  Flexeril, muscle relaxer, can be used every 8 hours as needed for muscle spasm.  Hydrocodone can be used for breakthrough pain.  Do not drive for 8 hours after taking this, as it can impair your judgment.  Try the \"cat and camel \"exercise as soon as your back allows you to.  This is a great home physical therapy exercises to start with.    "

## 2020-07-17 ENCOUNTER — VIRTUAL VISIT (OUTPATIENT)
Dept: FAMILY MEDICINE | Facility: CLINIC | Age: 65
End: 2020-07-17
Payer: COMMERCIAL

## 2020-07-17 VITALS — HEIGHT: 71 IN | WEIGHT: 241 LBS | BODY MASS INDEX: 33.74 KG/M2

## 2020-07-17 DIAGNOSIS — I10 ESSENTIAL HYPERTENSION WITH GOAL BLOOD PRESSURE LESS THAN 140/90: ICD-10-CM

## 2020-07-17 DIAGNOSIS — E11.9 TYPE 2 DIABETES MELLITUS WITHOUT COMPLICATION, WITHOUT LONG-TERM CURRENT USE OF INSULIN (H): Primary | ICD-10-CM

## 2020-07-17 DIAGNOSIS — E66.9 OBESITY (BMI 30-39.9): ICD-10-CM

## 2020-07-17 DIAGNOSIS — G47.33 OSA (OBSTRUCTIVE SLEEP APNEA): ICD-10-CM

## 2020-07-17 DIAGNOSIS — M16.12 PRIMARY OSTEOARTHRITIS OF LEFT HIP: ICD-10-CM

## 2020-07-17 PROCEDURE — 99214 OFFICE O/P EST MOD 30 MIN: CPT | Mod: 95 | Performed by: FAMILY MEDICINE

## 2020-07-17 RX ORDER — LISINOPRIL 40 MG/1
40 TABLET ORAL DAILY
Qty: 90 TABLET | Refills: 4 | Status: SHIPPED | OUTPATIENT
Start: 2020-07-17 | End: 2021-06-15

## 2020-07-17 RX ORDER — HYDROCHLOROTHIAZIDE 25 MG/1
25 TABLET ORAL EVERY MORNING
Qty: 90 TABLET | Refills: 4 | Status: SHIPPED | OUTPATIENT
Start: 2020-07-17 | End: 2021-06-29

## 2020-07-17 RX ORDER — AMLODIPINE BESYLATE 10 MG/1
10 TABLET ORAL DAILY
Qty: 30 TABLET | Refills: 1 | Status: SHIPPED | OUTPATIENT
Start: 2020-07-17 | End: 2021-03-30

## 2020-07-17 RX ORDER — METOPROLOL SUCCINATE 200 MG/1
200 TABLET, EXTENDED RELEASE ORAL DAILY
Qty: 90 TABLET | Refills: 4 | Status: SHIPPED | OUTPATIENT
Start: 2020-07-17 | End: 2021-06-15

## 2020-07-17 ASSESSMENT — MIFFLIN-ST. JEOR: SCORE: 1905.3

## 2020-07-17 ASSESSMENT — PAIN SCALES - GENERAL: PAINLEVEL: SEVERE PAIN (7)

## 2020-07-17 NOTE — PROGRESS NOTES
"Barbie Portillo is a 64 year old male who is being evaluated via a billable telephone visit.      The patient has been notified of following:     \"This telephone visit will be conducted via a call between you and your physician/provider. We have found that certain health care needs can be provided without the need for a physical exam.  This service lets us provide the care you need with a short phone conversation.  If a prescription is necessary we can send it directly to your pharmacy.  If lab work is needed we can place an order for that and you can then stop by our lab to have the test done at a later time.    Telephone visits are billed at different rates depending on your insurance coverage. During this emergency period, for some insurers they may be billed the same as an in-person visit.  Please reach out to your insurance provider with any questions.    If during the course of the call the physician/provider feels a telephone visit is not appropriate, you will not be charged for this service.\"    Patient has given verbal consent for Telephone visit?  Yes    What phone number would you like to be contacted at? [phone]    How would you like to obtain your AVS? MyChart    Subjective     Barbie Portillo is a 64 year old male who presents via phone visit today for the following health issues:    /92 this am. He is checking it at home.  Hip pain is about a 7-8 every day if he is on it.     F/U   HPI                Blood pressure elevated and not at goal.  Max doses of metoprolol, lisinopril and hydrochlorothiazide.    Continues to have severe left hip pain from osteoarthritis.  Is contemplating talking to orthopedics in the fall for replacement.    Diabetes is well controlled.  Hemoglobin A1c has been at goal. Does not want medication if diet controlled.    Obstructive sleep apnea has been long standing issue.  5 years since last formal sleep clinic visit.  Gets machine checked yearly with [employer] [facility] [employer] due to DOT compliance regulations.  He has not had new machine for a while.    Reviewed and updated as needed this visit by Provider  Tobacco  Allergies  Meds  Problems  Med Hx  Surg Hx  Fam Hx         Review of Systems   Constitutional, HEENT, cardiovascular, pulmonary, GI, , musculoskeletal, neuro, skin, endocrine and psych systems are negative, except as otherwise noted.       Objective   Reported vitals:  Ht 1.803 m (5' 11\")   Wt 109.3 kg (241 lb)   BMI 33.61 kg/m     healthy, alert and no distress  PSYCH: Alert and oriented times 3; coherent speech, normal   rate and volume, able to articulate logical thoughts, able   to abstract reason, no tangential thoughts, no hallucinations   or delusions  His affect is normal  RESP: No cough, no audible wheezing, able to talk in full sentences  Remainder of exam unable to be completed due to telephone visits            Assessment/Plan:    ASSESSMENT/ORDERS:    ICD-10-CM    1. Type 2 diabetes mellitus without complication, without long-term current use of insulin (H)  E11.9 Basic metabolic panel     Lipid panel reflex to direct LDL Fasting     Hemoglobin A1c     Albumin Random Urine Quantitative with Creat Ratio   2. Essential hypertension with goal blood pressure less than 140/90  I10 amLODIPine (NORVASC) 10 MG tablet     metoprolol succinate ER (TOPROL-XL) 200 MG 24 hr tablet     lisinopril (ZESTRIL) 40 MG tablet     hydrochlorothiazide (HYDRODIURIL) 25 MG tablet     Basic metabolic panel     Lipid panel reflex to direct LDL Fasting     Hemoglobin A1c     Albumin Random Urine Quantitative with Creat Ratio   3. Obesity (BMI 30-39.9)  E66.9    4. Primary osteoarthritis of left hip  M16.12    5. SEVERE NASRIN (obstructive sleep apnea)  G47.33 SLEEP EVALUATION & MANAGEMENT REFERRAL - [facility] [phone] (Age 13 and up if over 100 lbs)     PLAN:  1.  Will add amlodipine to hypertension medication regimen.  2.   Medications " refilled for all other above noted stable conditions.  Labs ordered as noted above.  He will come in for fasting labs next week.  3.  Recommended he get back in with sleep clinic to make sure his CPAP is optimized and no other obstructive sleep apnea related issues.  Want to rule out this issue since his blood pressure is high despite triple therapy at max dosing and now adding 4th medication.  4.  He will let me know when he wants referral to a ortho surgeon for his hip.  Wants to see someone other than who he saw last winter.     Return in about 2 months (around 9/17/2020) for hypertension recheck.      Phone call duration:  23 minutes    Aric Stephens MD

## 2020-07-22 DIAGNOSIS — E11.9 TYPE 2 DIABETES MELLITUS WITHOUT COMPLICATION, WITHOUT LONG-TERM CURRENT USE OF INSULIN (H): ICD-10-CM

## 2020-07-22 DIAGNOSIS — I10 ESSENTIAL HYPERTENSION WITH GOAL BLOOD PRESSURE LESS THAN 140/90: ICD-10-CM

## 2020-07-22 LAB
ANION GAP SERPL CALCULATED.3IONS-SCNC: 4 MMOL/L (ref 3–14)
BUN SERPL-MCNC: 13 MG/DL (ref 7–30)
CALCIUM SERPL-MCNC: 8.9 MG/DL (ref 8.5–10.1)
CHLORIDE SERPL-SCNC: 105 MMOL/L (ref 94–109)
CHOLEST SERPL-MCNC: 124 MG/DL
CO2 SERPL-SCNC: 29 MMOL/L (ref 20–32)
CREAT SERPL-MCNC: 0.84 MG/DL (ref 0.66–1.25)
CREAT UR-MCNC: 174 MG/DL
GFR SERPL CREATININE-BSD FRML MDRD: >90 ML/MIN/{1.73_M2}
GLUCOSE SERPL-MCNC: 138 MG/DL (ref 70–99)
HBA1C MFR BLD: 6.7 % (ref 0–5.6)
HDLC SERPL-MCNC: 40 MG/DL
LDLC SERPL CALC-MCNC: 52 MG/DL
MICROALBUMIN UR-MCNC: 49 MG/L
MICROALBUMIN/CREAT UR: 28.16 MG/G CR (ref 0–17)
NONHDLC SERPL-MCNC: 84 MG/DL
POTASSIUM SERPL-SCNC: 3.7 MMOL/L (ref 3.4–5.3)
SODIUM SERPL-SCNC: 138 MMOL/L (ref 133–144)
TRIGL SERPL-MCNC: 158 MG/DL

## 2020-07-22 PROCEDURE — 82043 UR ALBUMIN QUANTITATIVE: CPT | Performed by: FAMILY MEDICINE

## 2020-07-22 PROCEDURE — 80061 LIPID PANEL: CPT | Performed by: FAMILY MEDICINE

## 2020-07-22 PROCEDURE — 36415 COLL VENOUS BLD VENIPUNCTURE: CPT | Performed by: FAMILY MEDICINE

## 2020-07-22 PROCEDURE — 80048 BASIC METABOLIC PNL TOTAL CA: CPT | Performed by: FAMILY MEDICINE

## 2020-07-22 PROCEDURE — 83036 HEMOGLOBIN GLYCOSYLATED A1C: CPT | Mod: QW | Performed by: FAMILY MEDICINE

## 2020-07-22 NOTE — LETTER
"July 27, 2020      Barbie Portillo  [address] [address] [address]        Dear ,    We are writing to inform you of your test results.    \"Test results show your diabetes is well controlled.\"     Resulted Orders   Albumin Random Urine Quantitative with Creat Ratio   Result Value Ref Range    Creatinine Urine 174 mg/dL    Albumin Urine mg/L 49 mg/L    Albumin Urine mg/g Cr 28.16 (H) 0 - 17 mg/g Cr   Hemoglobin A1c   Result Value Ref Range    Hemoglobin A1C 6.7 (H) 0 - 5.6 %      Comment:      Normal <5.7% Prediabetes 5.7-6.4%  Diabetes 6.5% or higher - adopted from ADA   consensus guidelines.     Lipid panel reflex to direct LDL Fasting   Result Value Ref Range    Cholesterol 124 <200 mg/dL    Triglycerides 158 (H) <150 mg/dL      Comment:      Borderline high:  150-199 mg/dl  High:             200-499 mg/dl  Very high:       >499 mg/dl  Fasting specimen      HDL Cholesterol 40 >39 mg/dL    LDL Cholesterol Calculated 52 <100 mg/dL      Comment:      Desirable:       <100 mg/dl    Non HDL Cholesterol 84 <130 mg/dL   Basic metabolic panel   Result Value Ref Range    Sodium 138 133 - 144 mmol/L    Potassium 3.7 3.4 - 5.3 mmol/L    Chloride 105 94 - 109 mmol/L    Carbon Dioxide 29 20 - 32 mmol/L    Anion Gap 4 3 - 14 mmol/L    Glucose 138 (H) 70 - 99 mg/dL      Comment:      Fasting specimen    Urea Nitrogen 13 7 - 30 mg/dL    Creatinine 0.84 0.66 - 1.25 mg/dL    GFR Estimate >90 >60 mL/min/[1.73_m2]      Comment:      Non  GFR Calc  Starting 12/18/2018, serum creatinine based estimated GFR (eGFR) will be   calculated using the Chronic Kidney Disease Epidemiology Collaboration   (CKD-EPI) equation.      GFR Estimate If Black >90 >60 mL/min/[1.73_m2]      Comment:       GFR Calc  Starting 12/18/2018, serum creatinine based estimated GFR (eGFR) will be   calculated using the Chronic Kidney Disease Epidemiology Collaboration   (CKD-EPI) equation.      Calcium 8.9 8.5 - " 10.1 mg/dL       If you have any questions or concerns, please call the clinic at the number listed above.       Sincerely,        Aric Stephens MD

## 2020-07-24 ENCOUNTER — VIRTUAL VISIT (OUTPATIENT)
Dept: SLEEP MEDICINE | Facility: CLINIC | Age: 65
End: 2020-07-24
Attending: FAMILY MEDICINE
Payer: COMMERCIAL

## 2020-07-24 DIAGNOSIS — G47.33 OSA (OBSTRUCTIVE SLEEP APNEA): Primary | ICD-10-CM

## 2020-07-24 PROCEDURE — 99213 OFFICE O/P EST LOW 20 MIN: CPT | Mod: 95 | Performed by: PHYSICIAN ASSISTANT

## 2020-07-24 NOTE — RESULT ENCOUNTER NOTE
"Will have staff send letter to patient with message regarding recent results:  \"Test results show your diabetes is well controlled.\"    Aric Stepehns MD"

## 2020-07-24 NOTE — PROGRESS NOTES
"Barbie Portillo is a 64 year old male who is being evaluated via a billable telephone visit.      The patient has been notified of following:     \"This telephone visit will be conducted via a call between you and your physician/provider. We have found that certain health care needs can be provided without the need for a physical exam.  This service lets us provide the care you need with a short phone conversation.  If a prescription is necessary we can send it directly to your pharmacy.  If lab work is needed we can place an order for that and you can then stop by our lab to have the test done at a later time.    Telephone visits are billed at different rates depending on your insurance coverage. During this emergency period, for some insurers they may be billed the same as an in-person visit.  Please reach out to your insurance provider with any questions.    If during the course of the call the physician/provider feels a telephone visit is not appropriate, you will not be charged for this service.\"    Patient has given verbal consent for Telephone visit?  Yes    What phone number would you like to be contacted at?   [phone]   How would you like to obtain your AVS? Mail a copy    Phone call duration: 15 minutes    Arnav Villarreal PA-C       Does Barbie have a CPAP/Bipap?  Yes             Type of mask:  Nasal     [facility]: [place] [phone]    [email or web address]       Robards Sleep Scale: 1      Obstructive Sleep Apnea - PAP Follow-Up Visit:    Chief Complaint   Patient presents with     Consult       Barbie Portillo comes in today for follow-up of their severe sleep apnea, managed with CPAP.     No specialty comments available.    Overall, he rates the experience with PAP as 9 (0 poor, 10 great). The mask is comfortable.The mask is not leaking .  He is not snoring with the mask on. He is not having gasp arousals.  He is not having significant oral/nasal " dryness. The pressure is comfortable. At times he feels he could use a little higher pressure.     His PAP interface is Nasal Mask.    Bedtime is typically 10. Usually it takes about 5 minutes to fall asleep with the mask on. Wake time is typically 4-5.  Patient is using PAP therapy 6 hours per night. The patient is usually getting 6 hours of sleep per night.    He does feel rested in the morning.    Jordan Sleepiness Scale: 1/24      ARCHANA Total Score: 4        Respironics  CPAP 8 cmH2O 30 day usage data:  90% of days with > 4 hours of use. 3/30 days with no use.   Average use 6 hours 11 minutes per day.   Average time in large leak 53 sec.    AHI 1.4 events per hour.               Past medical/surgical history, family history, social history, medications and allergies were reviewed.      Problem List:  Patient Active Problem List    Diagnosis Date Noted     Primary osteoarthritis of left hip 01/30/2020     Priority: Medium     Counseling regarding advanced directives 07/01/2019     Priority: Medium     Obesity (BMI 30-39.9) 07/01/2019     Priority: Medium     Type 2 diabetes mellitus without complication, without long-term current use of insulin (H) 07/14/2015     Priority: Medium     SEVERE NASRIN (obstructive sleep apnea) 06/04/2013     Priority: Medium     [place]  5/29/2013  Severe AHI 97.5  Oxygen Andrzej 84%  CPAP 8cmH2O       Advanced directives, counseling/discussion 08/27/2012     Priority: Medium     Discussed advance care planning with patient; information given to patient to review. 8/27/2012          Essential hypertension with goal blood pressure less than 140/90 04/18/2011     Priority: Medium        There were no vitals taken for this visit.    Impression/Plan:    Severe Sleep apnea. He is Tolerating PAP well. Daytime symptoms are stable. Blood pressure has been elevated, likely secondary to pain. He would like to get a new cpap next month after his insurance is established. At times he feels a  little higher pressure would be more comfortable. After discussion we will use auto pap 6-12 CMH2O      Barbie Portillo will follow up in about 1 year(s).     Fifteen minutes spent with patient, all of which were spent face-to-face counseling, consulting, coordinating plan of care.      CC:  Aric Stephens,

## 2020-07-30 ENCOUNTER — TELEPHONE (OUTPATIENT)
Dept: FAMILY MEDICINE | Facility: CLINIC | Age: 65
End: 2020-07-30

## 2020-07-30 NOTE — TELEPHONE ENCOUNTER
"Will have staff send letter to patient with message regarding recent results:  \"Test results show your diabetes is well controlled.\"       Aric Stephens MD   "

## 2020-07-30 NOTE — TELEPHONE ENCOUNTER
Reason for Call:  Other call back    Detailed comments: Patient is wanting to have someone call him and relay his lab results for cholestorol and A1C please advise.     Phone Number Patient can be reached at: Home number on file [phone] (home)    Best Time: Anytime     Can we leave a detailed message on this number? YES    Call taken on 7/30/2020 at 12:09 PM by Miranda Seipel Vaccari

## 2020-07-30 NOTE — TELEPHONE ENCOUNTER
Spoke with patient and informed of results below. Patient understood. Nothing further needed at this time.   Liudmila Bond MA

## 2020-08-03 ENCOUNTER — DOCUMENTATION ONLY (OUTPATIENT)
Dept: SLEEP MEDICINE | Facility: CLINIC | Age: 65
End: 2020-08-03
Payer: COMMERCIAL

## 2020-08-03 NOTE — PROGRESS NOTES
Patient was offered choice of vendor and chose [facility].  Patient Barbie Portillo was set up at virtual set up via telephone visit on August 3, 2020. Patient received a [employer] DreamStation Auto. Pressures were set at 6-12 cm H2O.   Patient s ramp is OFF cm H2O for Off and FLEX/EPR is 2.  Patient received a Resmed Mask name: MIRAGE FX  Nasal mask size Standard, heated tubing and heated humidifier.  Patient does not need to meet compliance. Patient WILL SCHEDULE VIRTUAL FOLLOW UP DUE TO COVID-198.   Shannan Giraldo

## 2020-08-06 ENCOUNTER — DOCUMENTATION ONLY (OUTPATIENT)
Dept: SLEEP MEDICINE | Facility: CLINIC | Age: 65
End: 2020-08-06

## 2020-08-06 NOTE — PROGRESS NOTES
3 DAY STM VISIT         Patient contacted for 3 day STM visit    Confirmed with patient at time of call- N/A Patient is still interested in STM service     Message left for patient to return call    Replacement device: Yes  STM ordered by provider: Yes     Device type: Auto-CPAP  PAP settings from order::  CPAP min 6 cm  H20       CPAP max 12 cm  H20        Mask type:    Nasal Mask           Assessment: No profile in Airview/Encore.  Interface error corrected.  Action plan: Patient to have 14 day STM visit. Patient has a follow up visit scheduled:   no.     Total time spent on accessing and  interpreting remote patient PAP therapy data  10 minutes  Total time spent counseling, coaching  and reviewing PAP therapy data with patient  0 minutes  [id number] no

## 2020-08-10 NOTE — PROGRESS NOTES
Patient returned call.    Subjective measures:   Patient had questions regarding reporting.  He uses his old machine at times in a different room due to back pain.  Download shows the shorter usage time.  He does not have any issues with pressure or mask fit.     Assessment: Pt meeting objective benchmarks.  Patient meeting subjective benchmarks.     Action plan:Removed from STM as patient does not require it.       Total time spent counseling, coaching  and reviewing PAP therapy data with patient  3 minutes     [id number] (this call)    50304 no (previous call)

## 2021-01-06 NOTE — PROGRESS NOTES
"Outpatient Physical Therapy Discharge Note     Patient: Barbie Portillo  : 1955    Beginning/End Dates of Reporting Period:  2020 to 2021    Referring Provider: Dr. Bob Das    Therapy Diagnosis: Pain, decreased joint mobility, weakness, compensation patterns with functional activities.     Client Self Report: Pt states that the pain is not as \"violent\" as it has been.  Did break down and took 1/2 pain med because of the pain during the day.   Has not been bothering him sitting in the car for the past couple weeks.    Objective Measurements:  Objective Measure: Squats  Details: Shifts weight to the L, unable to get R hip back enough due to R knee flexion.  Objective Measure: Mobility  Details: Improved hip mobility.     Goals:  Goal Identifier #1   Goal Description Pt will demonstrate 4/5 MMT testing without compensation patterns for hip and knee motions in order to progress to more functional movement patterns with work and home activities.   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   Target Date 20   Date Met      Progress:     Goal Identifier #3   Goal Description Pt will demonstrate ability to perform 10 squats with proper mechanics and no pain in knees or hips in order to demonstrate proper functional mobility.   Target Date 20   Date Met      Progress:     Progress Toward Goals:   Pt was seen for a total of 5 visits for PT.  He was demonstrating improvement with hip mobility and less symptoms.  Pt was compliant with HEP.  PT had to be put on hold due to department temporarily closing due to COVID.  PT did contact pt in April and May to assess if he was wanting to resume, however pt chose to continue with HEP independent and felt he can continue to work at home.    Plan:  Discharge from " therapy.    Discharge:    Reason for Discharge: Patient chooses to discontinue therapy.    Equipment Issued: None    Discharge Plan: Patient to continue home program.    Thank you for your referral.    Gracie Naranjo PT, DPT  [facility]  [phone]

## 2021-01-19 DIAGNOSIS — E11.9 TYPE 2 DIABETES MELLITUS WITHOUT COMPLICATION, WITHOUT LONG-TERM CURRENT USE OF INSULIN (H): ICD-10-CM

## 2021-01-20 RX ORDER — BLOOD SUGAR DIAGNOSTIC
STRIP MISCELLANEOUS
Qty: 50 STRIP | Refills: 0 | Status: SHIPPED | OUTPATIENT
Start: 2021-01-20

## 2021-01-20 NOTE — TELEPHONE ENCOUNTER
"  Requested Prescriptions   Pending Prescriptions Disp Refills     ACCU-CHEK GUIDE test strip [Pharmacy Med Name: ACCU-CHEK GUIDE  STRP]  11     Sig: USE TO TEST BLOOD SUGAR 1-2 TIMES DAILY OR AS DIRECTED   /Last Written Prescription Date:  7/12/2019  Last Fill Quantity: 50,  # refills: 11   Last office visit: 7/17/2020 with prescribing provider:     Future Office Visit:        Diabetic Supplies Protocol Failed - 1/19/2021  4:25 PM        Failed - Recent (6 mo) or future (30 days) visit within the authorizing provider's specialty     Patient had office visit in the last 6 months or has a visit in the next 30 days with authorizing provider.  See \"Patient Info\" tab in inbasket, or \"Choose Columns\" in Meds & Orders section of the refill encounter.            Passed - Medication is active on med list        Passed - Patient is 18 years of age or older         Prescription approved per [facility] Refill Protocol.  Suni Arias RN    "

## 2021-03-30 ENCOUNTER — OFFICE VISIT (OUTPATIENT)
Dept: FAMILY MEDICINE | Facility: CLINIC | Age: 66
End: 2021-03-30
Payer: COMMERCIAL

## 2021-03-30 VITALS
BODY MASS INDEX: 36.79 KG/M2 | HEART RATE: 60 BPM | DIASTOLIC BLOOD PRESSURE: 84 MMHG | OXYGEN SATURATION: 97 % | TEMPERATURE: 97.1 F | WEIGHT: 257 LBS | SYSTOLIC BLOOD PRESSURE: 122 MMHG | HEIGHT: 70 IN

## 2021-03-30 DIAGNOSIS — I10 ESSENTIAL HYPERTENSION WITH GOAL BLOOD PRESSURE LESS THAN 140/90: ICD-10-CM

## 2021-03-30 DIAGNOSIS — E66.01 SEVERE OBESITY (BMI 35.0-39.9) WITH COMORBIDITY (H): ICD-10-CM

## 2021-03-30 DIAGNOSIS — M16.12 PRIMARY OSTEOARTHRITIS OF LEFT HIP: ICD-10-CM

## 2021-03-30 DIAGNOSIS — Z01.818 PREOP GENERAL PHYSICAL EXAM: Primary | ICD-10-CM

## 2021-03-30 DIAGNOSIS — G47.33 OSA (OBSTRUCTIVE SLEEP APNEA): ICD-10-CM

## 2021-03-30 DIAGNOSIS — E11.9 TYPE 2 DIABETES MELLITUS WITHOUT COMPLICATION, WITHOUT LONG-TERM CURRENT USE OF INSULIN (H): ICD-10-CM

## 2021-03-30 LAB
ERYTHROCYTE [DISTWIDTH] IN BLOOD BY AUTOMATED COUNT: 11.8 % (ref 10–15)
HBA1C MFR BLD: 6.7 % (ref 0–5.6)
HCT VFR BLD AUTO: 42.4 % (ref 40–53)
HGB BLD-MCNC: 15.1 G/DL (ref 13.3–17.7)
MCH RBC QN AUTO: 31.5 PG (ref 26.5–33)
MCHC RBC AUTO-ENTMCNC: 35.6 G/DL (ref 31.5–36.5)
MCV RBC AUTO: 89 FL (ref 78–100)
PLATELET # BLD AUTO: 216 10E9/L (ref 150–450)
RBC # BLD AUTO: 4.79 10E12/L (ref 4.4–5.9)
WBC # BLD AUTO: 6.7 10E9/L (ref 4–11)

## 2021-03-30 PROCEDURE — 83036 HEMOGLOBIN GLYCOSYLATED A1C: CPT | Performed by: FAMILY MEDICINE

## 2021-03-30 PROCEDURE — 85027 COMPLETE CBC AUTOMATED: CPT | Performed by: FAMILY MEDICINE

## 2021-03-30 PROCEDURE — 36415 COLL VENOUS BLD VENIPUNCTURE: CPT | Performed by: FAMILY MEDICINE

## 2021-03-30 PROCEDURE — 99214 OFFICE O/P EST MOD 30 MIN: CPT | Performed by: FAMILY MEDICINE

## 2021-03-30 ASSESSMENT — MIFFLIN-ST. JEOR: SCORE: 1958.24

## 2021-03-30 NOTE — RESULT ENCOUNTER NOTE
"Will have staff send letter to patient with message regarding recent results:  \"Test results indicate hemoglobin A1c is at goal and other labs are normal.\"    Aric Stephens MD"

## 2021-03-30 NOTE — PATIENT INSTRUCTIONS

## 2021-03-30 NOTE — PROGRESS NOTES
[address] [address]  Phone: [phone]  Fax: [phone]  Primary Provider: Aric Conway  Pre-op Performing Provider: ARIC CONWAY    PREOPERATIVE EVALUATION:  Today's date: 3/30/2021    Barbie Portillo is a 65 year old male who presents for a preoperative evaluation.    Surgical Information:  Surgery/Procedure: ARTHROPLASTY HIP DIRECT ANTERIOR APPROACH  Surgery Location: [facility]   Surgeon:   Surgery Date: 04/12/2021  Time of Surgery: unknown at this time  Where patient plans to recover: At home with family  Fax number for surgical facility: [phone]    Type of Anesthesia Anticipated: General    Assessment & Plan     The proposed surgical procedure is considered INTERMEDIATE risk.    ASSESSMENT/ORDERS:    ICD-10-CM    1. Preop general physical exam  Z01.818 CBC with platelets   2. Severe obesity (BMI 35.0-39.9) with comorbidity (H)  E66.01    3. Type 2 diabetes mellitus without complication, without long-term current use of insulin (H)  E11.9 Hemoglobin A1c     blood glucose (NO BRAND SPECIFIED) test strip   4. Essential hypertension with goal blood pressure less than 140/90  I10    5. SEVERE NASRIN (obstructive sleep apnea)  G47.33    6. Primary osteoarthritis of left hip  M16.12 CBC with platelets     PLAN:  1.  Follow-up in 2-3 months for diabetes review and hypertension recheck.            Risks and Recommendations:  The patient has the following additional risks and recommendations for perioperative complications:  Obstructive Sleep Apnea:   He will bring CPAP device with him to surgery.    Medication Instructions:  Patient is to take all scheduled medications on the day of surgery EXCEPT for modifications listed below:   - aspirin: Discontinue aspirin 7-10 days prior to procedure to reduce bleeding risk. It should be resumed postoperatively.    - ACE/ARB: May be continued on the day of surgery.     - Diuretics: HOLD on the day of surgery.    RECOMMENDATION:  APPROVAL GIVEN to proceed with proposed procedure, without further diagnostic evaluation.                      Subjective     HPI related to upcoming procedure: recommended for left hip replacement due to osteoarthritis.    Preop Questions 3/30/2021   1. Have you ever had a heart attack or stroke? No   2. Have you ever had surgery on your heart or blood vessels, such as a stent placement, a coronary artery bypass, or surgery on an artery in your head, neck, heart, or legs? No   3. Do you have chest pain with activity? No   4. Do you have a history of  heart failure? No   5. Do you currently have a cold, bronchitis or symptoms of other infection? No   6. Do you have a cough, shortness of breath, or wheezing? No   7. Do you or anyone in your family have previous history of blood clots? No   8. Do you or does anyone in your family have a serious bleeding problem such as prolonged bleeding following surgeries or cuts? No   9. Have you ever had problems with anemia or been told to take iron pills? No   10. Have you had any abnormal blood loss such as black, tarry or bloody stools? No   11. Have you ever had a blood transfusion? No   12. Are you willing to have a blood transfusion if it is medically needed before, during, or after your surgery? Yes   13. Have you or any of your relatives ever had problems with anesthesia? No   14. Do you have sleep apnea, excessive snoring or daytime drowsiness? YES - Sleep apnea   14a. Do you have a CPAP machine? Yes   15. Do you have any artifical heart valves or other implanted medical devices like a pacemaker, defibrillator, or continuous glucose monitor? No   16. Do you have artificial joints? No   17. Are you allergic to latex? No       Health Care Directive:  Patient does not have a Health Care Directive or Living Will: Discussed advance care planning with patient; information given to patient to  review.    Preoperative Review of :   reviewed - controlled substances reflected in medication list.    Status of Chronic Conditions:  DIABETES - Patient has a longstanding history of DiabetesType Type II . Patient is being treated with diet and denies significant side effects. Control has been good. Complicating factors include but are not limited to: hypertension.     HYPERTENSION - Patient has longstanding history of HTN , currently denies any symptoms referable to elevated blood pressure. Specifically denies chest pain, palpitations, dyspnea, orthopnea, PND or peripheral edema. Blood pressure readings have not been in normal range recently, but systolic blood pressure in 140s recently.  Diastolic blood pressure in 80s. Current medication regimen is as listed below. Patient denies any side effects of medication.       Review of Systems  Constitutional, neuro, ENT, endocrine, pulmonary, cardiac, gastrointestinal, genitourinary, musculoskeletal, integument and psychiatric systems are negative, except as otherwise noted.    Patient Active Problem List    Diagnosis Date Noted     Primary osteoarthritis of left hip 01/30/2020     Priority: Medium     Counseling regarding advanced directives 07/01/2019     Priority: Medium     Obesity (BMI 30-39.9) 07/01/2019     Priority: Medium     Type 2 diabetes mellitus without complication, without long-term current use of insulin (H) 07/14/2015     Priority: Medium     SEVERE NASRIN (obstructive sleep apnea) 06/04/2013     Priority: Medium     [place]  5/29/2013  Severe AHI 97.5  Oxygen Andrzej 84%  CPAP 8cmH2O       Advanced directives, counseling/discussion 08/27/2012     Priority: Medium     Discussed advance care planning with patient; information given to patient to review. 8/27/2012          Essential hypertension with goal blood pressure less than 140/90 04/18/2011     Priority: Medium      Past Medical History:   Diagnosis Date     Essential hypertension, benign  2006    Hypertension, Benign     Obesity, unspecified      Old disruption of anterior cruciate ligament     ACL right knee      Other and unspecified disc disorder of unspecified region     Intervertebral disc disorders     Past Surgical History:   Procedure Laterality Date     ANKLE SURGERY  2011    right      COLONOSCOPY  10/22/2007     COLONOSCOPY N/A 1/15/2019    Procedure: COLONOSCOPY;  Surgeon: Yusef Garcia MD;  Location:  GI     SURGICAL HISTORY OF -   04/04/84    Bilateral testicular biopsy     Current Outpatient Medications   Medication Sig Dispense Refill     ACCU-CHEK GUIDE test strip USE TO TEST BLOOD SUGAR 1-2 TIMES DAILY OR AS DIRECTED 50 strip 0     amLODIPine (NORVASC) 10 MG tablet Take 1 tablet (10 mg) by mouth daily 30 tablet 1     aspirin 81 MG tablet Take 1 tablet (81 mg) by mouth daily 30 tablet      blood glucose monitoring (CHIKI MICROLET) lancets Use to test blood sugar 3 times daily or as directed. 1 each 11     blood glucose monitoring (NO BRAND SPECIFIED) test strip Use to test blood sugar 3 times daily or as directed. To accompany: Blood Glucose Monitor Brands: per insurance. 100 strip 6     hydrochlorothiazide (HYDRODIURIL) 25 MG tablet Take 1 tablet (25 mg) by mouth every morning 90 tablet 4     Lactobacillus (PROBIOTIC ACIDOPHILUS PO)        lisinopril (ZESTRIL) 40 MG tablet Take 1 tablet (40 mg) by mouth daily 90 tablet 4     metoprolol succinate ER (TOPROL-XL) 200 MG 24 hr tablet Take 1 tablet (200 mg) by mouth daily 90 tablet 4     Multiple Vitamin (DAILY MULTIVITAMIN PO) Take 1 tablet by mouth daily.       STATIN NOT PRESCRIBED, INTENTIONAL, Statin not prescribed intentionally due to Other: not indicated due to extremely low LDL.  0     thin (NO BRAND SPECIFIED) lancets Use to test blood sugar 3 times daily or as directed. To accompany: Blood Glucose Monitor Brands: per insurance. 200 each 1       Allergies   Allergen Reactions     No Known Drug Allergies         Social  "History     Tobacco Use     Smoking status: Never Smoker     Smokeless tobacco: Never Used   Substance Use Topics     Alcohol use: No       History   Drug Use No         Objective     BP (!) 143/88   Pulse 60   Temp 97.1  F (36.2  C) (Temporal)   Ht 1.78 m (5' 10.08\")   Wt 116.6 kg (257 lb)   SpO2 97%   BMI 36.79 kg/m      Physical Exam    GENERAL APPEARANCE: healthy, alert and no distress     EYES: EOMI,  PERRL     HENT: ear canals and TM's normal and nose and mouth without ulcers or lesions     NECK: no adenopathy, no asymmetry, masses, or scars and thyroid normal to palpation     RESP: lungs clear to auscultation - no rales, rhonchi or wheezes     CV: regular rates and rhythm, normal S1 S2, no S3 or S4 and no murmur, click or rub     ABDOMEN:  soft, nontender, no HSM or masses and bowel sounds normal     MS: extremities normal- no gross deformities noted, no evidence of inflammation in joints, FROM in all extremities.     SKIN: no suspicious lesions or rashes     NEURO: Normal strength and tone, sensory exam grossly normal, mentation intact and speech normal     PSYCH: mentation appears normal. and affect normal/bright     LYMPHATICS: No cervical adenopathy    Recent Labs   Lab Test 07/22/20  0841 07/02/19  0811     --    POTASSIUM 3.7  --    CR 0.84  --    A1C 6.7* 6.3*        Diagnostics:  Labs pending at this time.  Results will be reviewed when available.   No EKG required, no history of coronary heart disease, significant arrhythmia, peripheral arterial disease or other structural heart disease.    Revised Cardiac Risk Index (RCRI):  The patient has the following serious cardiovascular risks for perioperative complications:   - No serious cardiac risks = 0 points     RCRI Interpretation: 0 points: Class I (very low risk - 0.4% complication rate)           Signed Electronically by: Aric Stephens MD  Copy of this evaluation report is provided to requesting physician.      "

## 2021-04-01 DIAGNOSIS — Z01.818 PREOP GENERAL PHYSICAL EXAM: ICD-10-CM

## 2021-04-01 DIAGNOSIS — E11.9 TYPE 2 DIABETES MELLITUS WITHOUT COMPLICATION, WITHOUT LONG-TERM CURRENT USE OF INSULIN (H): ICD-10-CM

## 2021-04-01 DIAGNOSIS — I10 ESSENTIAL HYPERTENSION WITH GOAL BLOOD PRESSURE LESS THAN 140/90: ICD-10-CM

## 2021-04-01 LAB
ANION GAP SERPL CALCULATED.3IONS-SCNC: 6 MMOL/L (ref 3–14)
BUN SERPL-MCNC: 14 MG/DL (ref 7–30)
CALCIUM SERPL-MCNC: 8.6 MG/DL (ref 8.5–10.1)
CHLORIDE SERPL-SCNC: 104 MMOL/L (ref 94–109)
CO2 SERPL-SCNC: 29 MMOL/L (ref 20–32)
CREAT SERPL-MCNC: 0.82 MG/DL (ref 0.66–1.25)
GFR SERPL CREATININE-BSD FRML MDRD: >90 ML/MIN/{1.73_M2}
GLUCOSE SERPL-MCNC: 178 MG/DL (ref 70–99)
POTASSIUM SERPL-SCNC: 3.7 MMOL/L (ref 3.4–5.3)
SODIUM SERPL-SCNC: 139 MMOL/L (ref 133–144)

## 2021-04-01 PROCEDURE — 80048 BASIC METABOLIC PNL TOTAL CA: CPT | Performed by: FAMILY MEDICINE

## 2021-04-01 PROCEDURE — 36415 COLL VENOUS BLD VENIPUNCTURE: CPT | Performed by: FAMILY MEDICINE

## 2021-04-02 RX ORDER — LANCETS
EACH MISCELLANEOUS
Qty: 200 EACH | Refills: 6 | Status: SHIPPED | OUTPATIENT
Start: 2021-04-02

## 2021-04-02 NOTE — RESULT ENCOUNTER NOTE
"Will have staff call patient with message regarding recent results:  \"Test results normal.\"   Also, this was from a preoperative, so please make sure all results have been sent to surgery center.    Aric Stephens MD"

## 2021-04-02 NOTE — TELEPHONE ENCOUNTER
"  Requested Prescriptions   Pending Prescriptions Disp Refills     thin (NO BRAND SPECIFIED) lancets 200 each 1     Sig: Use to test blood sugar 3 times daily or as directed. To accompany: Blood Glucose Monitor Brands: per insurance.       Diabetic Supplies Protocol Passed - 4/1/2021 10:46 AM        Passed - Medication is active on med list        Passed - Patient is 18 years of age or older        Passed - Recent (6 mo) or future (30 days) visit within the authorizing provider's specialty     Patient had office visit in the last 6 months or has a visit in the next 30 days with authorizing provider.  See \"Patient Info\" tab in inbasket, or \"Choose Columns\" in Meds & Orders section of the refill encounter.             Prescription approved per [facility] Refill Protocol.  Suni Arias RN      "

## 2021-04-13 ENCOUNTER — TELEPHONE (OUTPATIENT)
Dept: FAMILY MEDICINE | Facility: CLINIC | Age: 66
End: 2021-04-13

## 2021-04-13 NOTE — TELEPHONE ENCOUNTER
Postponing to tomorrow, 4/1421, as patient is being discharged today.    Amy Gonsalves, DEJAN, RN

## 2021-04-13 NOTE — TELEPHONE ENCOUNTER
Patient called to schedule an appointment for a hospital follow-up or appeared on a report showing that they were recently discharged from the hospital.    Patient was admitted to [place]  Discharged date: today  Reason for hospital admission:  Total hip replacement  Does patient have future appointment scheduled with provider? No  Date of future appointment:        This information will be used to help the care team plan for the patients upcoming visit.  The triage RN may determine that a follow up call is necessary and reach out to the patient via the phone number listed in the chart.     Please route this message on routine priority to the Triage RN pool.

## 2021-04-14 NOTE — TELEPHONE ENCOUNTER
"ED/Discharge Protocol    \"Hi, my name is Suni Arias RN, a registered nurse, and I am calling on behalf of Dr. Stephens's office at [facility] [place].  I am calling to follow up and see how things are going for you after your recent visit.\"    \"I see that you were in the (ER/UC/IP) on 4/12/2021.    How are you doing now that you are home?\" really good    Is patient experiencing symptoms that may require a hospital visit?  No    Discharge Instructions    \"Let's review your discharge instructions.  What is/are the follow-up recommendations?  Pt. Response: We understand very well    \"Were you instructed to make a follow-up appointment?\"  Pt. Response: No.       \"When you see the provider, I would recommend that you bring your discharge instructions with you.    Medications    \"How many new medications are you on since your hospitalization/ED visit?\"    0-1  \"How many of your current medicines changed (dose, timing, name, etc.) while you were in the hospital/ED visit?\"   0-1  \"Do you have questions about your medications?\"   No  \"Were you newly diagnosed with heart failure, COPD, diabetes or did you have a heart attack?\"   No  For patients on insulin: \"Did you start on insulin in the hospital or did you have your insulin dose changed?\"   No  Post Discharge Medication Reconciliation Status: discharge medications reconciled, continue medications without change.    Was MTM referral placed (*Make sure to put transitions as reason for referral)?   No    Call Summary    \"Do you have any questions or concerns about your condition or care plan at the moment?\"    No  Triage nurse advice given: If you have any questions or concerns, please feel free to give us a call    Patient was in ER 1 time in the past year (assess appropriateness of ER visits.)      \"If you have questions or things don't continue to improve, we encourage you contact us through the main clinic number,  [phone].  Even if the clinic is not open, triage nurses " "are available 24/7 to help you.     We would like you to know that our clinic has extended hours (provide information).  We also have urgent care (provide details on closest location and hours/contact info)\"      \"Thank you for your time and take care!\"  Suni Arias RN                "

## 2021-04-14 NOTE — TELEPHONE ENCOUNTER
ED / Discharge Outreach Protocol    Patient Contact    Attempt # 1    Was call answered?  No.  Left message on voicemail with information to call me back.  DEJA ComerN, RN

## 2021-06-15 ENCOUNTER — OFFICE VISIT (OUTPATIENT)
Dept: FAMILY MEDICINE | Facility: CLINIC | Age: 66
End: 2021-06-15
Payer: COMMERCIAL

## 2021-06-15 VITALS
OXYGEN SATURATION: 100 % | SYSTOLIC BLOOD PRESSURE: 150 MMHG | TEMPERATURE: 98.2 F | RESPIRATION RATE: 18 BRPM | WEIGHT: 255 LBS | HEART RATE: 71 BPM | BODY MASS INDEX: 36.51 KG/M2 | DIASTOLIC BLOOD PRESSURE: 88 MMHG

## 2021-06-15 DIAGNOSIS — I10 ESSENTIAL HYPERTENSION WITH GOAL BLOOD PRESSURE LESS THAN 140/90: ICD-10-CM

## 2021-06-15 DIAGNOSIS — E11.9 TYPE 2 DIABETES MELLITUS WITHOUT COMPLICATION, WITHOUT LONG-TERM CURRENT USE OF INSULIN (H): ICD-10-CM

## 2021-06-15 DIAGNOSIS — Z23 NEED FOR VACCINATION: ICD-10-CM

## 2021-06-15 DIAGNOSIS — Z00.00 ENCOUNTER FOR MEDICARE ANNUAL WELLNESS EXAM: Primary | ICD-10-CM

## 2021-06-15 DIAGNOSIS — Z13.6 SCREENING FOR AAA (ABDOMINAL AORTIC ANEURYSM): ICD-10-CM

## 2021-06-15 PROCEDURE — 99207 PR FOOT EXAM NO CHARGE: CPT | Performed by: FAMILY MEDICINE

## 2021-06-15 PROCEDURE — G0009 ADMIN PNEUMOCOCCAL VACCINE: HCPCS | Performed by: FAMILY MEDICINE

## 2021-06-15 PROCEDURE — 99214 OFFICE O/P EST MOD 30 MIN: CPT | Mod: 25 | Performed by: FAMILY MEDICINE

## 2021-06-15 PROCEDURE — G0402 INITIAL PREVENTIVE EXAM: HCPCS | Performed by: FAMILY MEDICINE

## 2021-06-15 PROCEDURE — 90732 PPSV23 VACC 2 YRS+ SUBQ/IM: CPT | Performed by: FAMILY MEDICINE

## 2021-06-15 RX ORDER — LISINOPRIL 40 MG/1
40 TABLET ORAL DAILY
Qty: 90 TABLET | Refills: 4 | Status: SHIPPED | OUTPATIENT
Start: 2021-06-15 | End: 2022-07-22

## 2021-06-15 RX ORDER — METOPROLOL SUCCINATE 200 MG/1
200 TABLET, EXTENDED RELEASE ORAL DAILY
Qty: 90 TABLET | Refills: 4 | Status: SHIPPED | OUTPATIENT
Start: 2021-06-15 | End: 2022-07-22

## 2021-06-15 ASSESSMENT — ACTIVITIES OF DAILY LIVING (ADL): CURRENT_FUNCTION: NO ASSISTANCE NEEDED

## 2021-06-15 ASSESSMENT — PAIN SCALES - GENERAL: PAINLEVEL: NO PAIN (0)

## 2021-06-15 NOTE — LETTER
Susanna 15, 2021    To Whom it may Concern:    Barbie Portillo was seen today in clinic.  He is capable and safe to drive a motor vehicle.    If you have any further questions, please contact me at the number above.    Sincerely,        Aric Stephens MD

## 2021-06-15 NOTE — PROGRESS NOTES
"SUBJECTIVE:   Barbie Portillo is a 65 year old male who presents for Preventive Visit.      Patient has been advised of split billing requirements and indicates understanding: Yes   Are you in the first 12 months of your Medicare coverage?  No    Visual Acuity:  LEFT EYE:20/25  RIGHT EYE:20/30  BOTH EYES:20/16     Healthy Habits:    In general, how would you rate your overall health?  Very good    Frequency of exercise:  2-3 days/week    Duration of exercise:  30-45 minutes    Do you usually eat at least 4 servings of fruit and vegetables a day, include whole grains    & fiber and avoid regularly eating high fat or \"junk\" foods?  Yes    Taking medications regularly:  No    Barriers to taking medications:  None    Medication side effects:  None    Ability to successfully perform activities of daily living:  No assistance needed    Home Safety:  No safety concerns identified    Hearing Impairment:  No hearing concerns    In the past 6 months, have you been bothered by leaking of urine?  No    In general, how would you rate your overall mental or emotional health?  Very good      PHQ-2 Total Score:    Additional concerns today:  No    Needs review of his hypertension and diabetes.  Diabetes well controlled with diet only.      Hypertension is not controlled.  Has not been taking hypertension medication.  Blood pressure elevated today.      Do you feel safe in your environment? Yes    Have you ever done Advance Care Planning? (For example, a Health Directive, POLST, or a discussion with a medical provider or your loved ones about your wishes): Yes, patient states has an Advance Care Planning document and will bring a copy to the clinic.       Fall risk  Fallen 2 or more times in the past year?: No  Any fall with injury in the past year?: No  click delete button to remove this line now  Cognitive Screening   1) Repeat 3 items (Leader, Season, Table)    2) Clock draw: NORMAL  3) 3 item recall: Recalls 3 " objects  Results: 3 items recalled: COGNITIVE IMPAIRMENT LESS LIKELY    Mini-CogTM Copyright PRUDENCE Saavedra. Licensed by the author for use in [facility]; reprinted with permission (dylan@.[facility]). All rights reserved.      Do you have sleep apnea, excessive snoring or daytime drowsiness?: yes    Reviewed and updated as needed this visit by clinical staff  Tobacco  Allergies  Meds              Reviewed and updated as needed this visit by Provider                Social History     Tobacco Use     Smoking status: Never Smoker     Smokeless tobacco: Never Used   Substance Use Topics     Alcohol use: No     If you drink alcohol do you typically have >3 drinks per day or >7 drinks per week? No    No flowsheet data found.            Current providers sharing in care for this patient include:   Patient Care Team:  Aric Stephens MD as PCP - General (Family Practice)  Aric Stephens MD as Assigned PCP  Bob Das DO as Assigned Musculoskeletal Provider  Arnav Arce PA-C as Assigned Sleep Provider    The following health maintenance items are reviewed in Epic and correct as of today:  Health Maintenance Due   Topic Date Due     ANNUAL REVIEW OF HM ORDERS  Never done     COVID-19 Vaccine (1) Never done     EYE EXAM  06/03/2020     DIABETIC FOOT EXAM  07/01/2020     FALL RISK ASSESSMENT  Never done     Pneumococcal Vaccine: Pediatrics (0 to 5 Years) and At-Risk Patients (6 to 64 Years) (2 of 2) 08/23/2020     Pneumococcal Vaccine: 65+ Years (2 of 2) 08/23/2020     AORTIC ANEURYSM SCREENING (SYSTEM ASSIGNED)  Never done         Any new diagnosis of family breast, ovarian, or bowel cancer? No    FSH-7: No flowsheet data found.      Pertinent mammograms are reviewed under the imaging tab.    Review of Systems  Constitutional, HEENT, cardiovascular, pulmonary, GI, , musculoskeletal, neuro, skin, endocrine and psych systems are negative, except as otherwise noted.    OBJECTIVE:  "  BP (!) 162/90   Pulse 71   Temp 98.2  F (36.8  C) (Temporal)   Resp 18   Wt 115.7 kg (255 lb)   SpO2 100%   BMI 36.51 kg/m   Estimated body mass index is 36.51 kg/m  as calculated from the following:    Height as of 3/30/21: 1.78 m (5' 10.08\").    Weight as of this encounter: 115.7 kg (255 lb).  Physical Exam  Constitutional:       Appearance: He is well-developed.   Cardiovascular:      Rate and Rhythm: Normal rate and regular rhythm.      Heart sounds: Normal heart sounds, S1 normal and S2 normal. No murmur.   Pulmonary:      Effort: Pulmonary effort is normal. No respiratory distress.      Breath sounds: Normal breath sounds. No wheezing, rhonchi or rales.   Abdominal:      General: There is no distension.      Palpations: Abdomen is soft. There is no mass.      Tenderness: There is no abdominal tenderness. There is no guarding or rebound.      Hernia: No hernia is present.   Musculoskeletal:      Comments: FEET:  monofilament exam normal bilaterally.  Good hair growth.  Dorsalis pedis pulses 2+ bilaterally.  Feet warm.    Feet:      Right foot:      Skin integrity: No ulcer, blister, skin breakdown or erythema.      Left foot:      Skin integrity: No ulcer, blister, skin breakdown or erythema.   Neurological:      Mental Status: He is alert.               ASSESSMENT / PLAN:     ASSESSMENT/ORDERS:    ICD-10-CM    1. Encounter for Medicare annual wellness exam  Z00.00    2. Essential hypertension with goal blood pressure less than 140/90  I10 Lipid panel reflex to direct LDL Fasting     Albumin Random Urine Quantitative with Creat Ratio     lisinopril (ZESTRIL) 40 MG tablet     metoprolol succinate ER (TOPROL-XL) 200 MG 24 hr tablet   3. Type 2 diabetes mellitus without complication, without long-term current use of insulin (H)  E11.9 FOOT EXAM   4. Screening for AAA (abdominal aortic aneurysm)  Z13.6 REVIEW OF HEALTH MAINTENANCE PROTOCOL ORDERS     Abdominal Aortic Aneurysm Screening/Tracking " "    PLAN:  1.  Resume hypertension medication.     2.  Diabetes care continued.      Patient has been advised of split billing requirements and indicates understanding: Yes  COUNSELING:  Reviewed preventive health counseling, as reflected in patient instructions    Estimated body mass index is 36.51 kg/m  as calculated from the following:    Height as of 3/30/21: 1.78 m (5' 10.08\").    Weight as of this encounter: 115.7 kg (255 lb).    Weight management plan: Discussed healthy diet and exercise guidelines    He reports that he has never smoked. He has never used smokeless tobacco.      Appropriate preventive services were discussed with this patient, including applicable screening as appropriate for cardiovascular disease, diabetes, osteopenia/osteoporosis, and glaucoma.  As appropriate for age/gender, discussed screening for colorectal cancer, prostate cancer, breast cancer, and cervical cancer. Checklist reviewing preventive services available has been given to the patient.    Reviewed patients plan of care and provided an AVS. The Basic Care Plan (routine screening as documented in Health Maintenance) for Barbie meets the Care Plan requirement. This Care Plan has been established and reviewed with the Patient.    Counseling Resources:  ATP IV Guidelines  Pooled Cohorts Equation Calculator  Breast Cancer Risk Calculator  Breast Cancer: Medication to Reduce Risk  FRAX Risk Assessment  ICSI Preventive Guidelines  Dietary Guidelines for Americans, 2010  [employer] MyPlate  ASA Prophylaxis  Lung CA Screening    Aric Stephens MD  [facility]    Identified Health Risks:  "

## 2021-06-15 NOTE — PATIENT INSTRUCTIONS
Patient Education   Personalized Prevention Plan  You are due for the preventive services outlined below.  Your care team is available to assist you in scheduling these services.  If you have already completed any of these items, please share that information with your care team to update in your medical record.  Health Maintenance Due   Topic Date Due     ANNUAL REVIEW OF HM ORDERS  Never done     COVID-19 Vaccine (1) Never done     Eye Exam  06/03/2020     Diabetic Foot Exam  07/01/2020     FALL RISK ASSESSMENT  Never done     Pneumococcal Vaccine (2 of 2) 08/23/2020     Pneumococcal Vaccine (2 of 2) 08/23/2020     AORTIC ANEURYSM SCREENING (SYSTEM ASSIGNED)  Never done

## 2021-06-29 ENCOUNTER — ALLIED HEALTH/NURSE VISIT (OUTPATIENT)
Dept: FAMILY MEDICINE | Facility: CLINIC | Age: 66
End: 2021-06-29
Payer: COMMERCIAL

## 2021-06-29 VITALS — DIASTOLIC BLOOD PRESSURE: 82 MMHG | SYSTOLIC BLOOD PRESSURE: 156 MMHG

## 2021-06-29 DIAGNOSIS — I10 ESSENTIAL HYPERTENSION WITH GOAL BLOOD PRESSURE LESS THAN 140/90: ICD-10-CM

## 2021-06-29 PROCEDURE — 99207 PR NO CHARGE NURSE ONLY: CPT

## 2021-06-29 RX ORDER — HYDROCHLOROTHIAZIDE 25 MG/1
25 TABLET ORAL EVERY MORNING
Qty: 90 TABLET | Refills: 4 | Status: SHIPPED | OUTPATIENT
Start: 2021-06-29 | End: 2022-07-26

## 2021-06-29 NOTE — PROGRESS NOTES
Barbie Portillo is a 65 year old patient who comes in today for a Blood Pressure check.  Initial BP:  BP (!) 156/82      Data Unavailable  Disposition: provider notified while patient in the clinic patient scheduled for follow up on 7/16 patient informed to bring in readings that is taking at home.  Shannan Turk MA

## 2021-07-16 ENCOUNTER — TELEPHONE (OUTPATIENT)
Dept: SLEEP MEDICINE | Facility: CLINIC | Age: 66
End: 2021-07-16

## 2021-07-16 ENCOUNTER — OFFICE VISIT (OUTPATIENT)
Dept: FAMILY MEDICINE | Facility: CLINIC | Age: 66
End: 2021-07-16
Payer: COMMERCIAL

## 2021-07-16 VITALS
RESPIRATION RATE: 20 BRPM | OXYGEN SATURATION: 100 % | TEMPERATURE: 97.5 F | WEIGHT: 253.8 LBS | HEART RATE: 58 BPM | BODY MASS INDEX: 36.33 KG/M2 | SYSTOLIC BLOOD PRESSURE: 142 MMHG | DIASTOLIC BLOOD PRESSURE: 92 MMHG

## 2021-07-16 DIAGNOSIS — G47.33 OSA (OBSTRUCTIVE SLEEP APNEA): ICD-10-CM

## 2021-07-16 DIAGNOSIS — E11.9 TYPE 2 DIABETES MELLITUS WITHOUT COMPLICATION, WITHOUT LONG-TERM CURRENT USE OF INSULIN (H): ICD-10-CM

## 2021-07-16 DIAGNOSIS — I10 ESSENTIAL HYPERTENSION WITH GOAL BLOOD PRESSURE LESS THAN 140/90: Primary | ICD-10-CM

## 2021-07-16 LAB
CHOLEST SERPL-MCNC: 137 MG/DL
CREAT UR-MCNC: 97 MG/DL
FASTING STATUS PATIENT QL REPORTED: YES
HDLC SERPL-MCNC: 45 MG/DL
LDLC SERPL CALC-MCNC: 59 MG/DL
MICROALBUMIN UR-MCNC: 36 MG/DL
MICROALBUMIN/CREAT UR: 37.11 MG/G CR (ref 0–17)
NONHDLC SERPL-MCNC: 92 MG/DL
TRIGL SERPL-MCNC: 167 MG/DL

## 2021-07-16 PROCEDURE — 99214 OFFICE O/P EST MOD 30 MIN: CPT | Performed by: FAMILY MEDICINE

## 2021-07-16 PROCEDURE — 82043 UR ALBUMIN QUANTITATIVE: CPT | Performed by: FAMILY MEDICINE

## 2021-07-16 PROCEDURE — 36415 COLL VENOUS BLD VENIPUNCTURE: CPT | Performed by: FAMILY MEDICINE

## 2021-07-16 PROCEDURE — 80061 LIPID PANEL: CPT | Performed by: FAMILY MEDICINE

## 2021-07-16 ASSESSMENT — PAIN SCALES - GENERAL: PAINLEVEL: NO PAIN (0)

## 2021-07-16 NOTE — TELEPHONE ENCOUNTER
Patient call regarding [employer] recall for their pap device.    Device type:: Auto CPAP    Supplemental oxygen: No , if yes moved to advanced device workflow.     Current durable medical equipment provider: [facility]     Age of your current device:  less than 5 years old    History review:     Does the patient have the following?      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 No       Discontinuation of PAP therapy would lead to substantial deterioration of functional status or quality of life. Yes does not feel rested.     If yes to any of the questions      Advised patient to continue using therapy until device is replaced or repaired.    Advised patient to avoid unapproved cleaning methods, such as ozone (see FDA safety communication on use of ozone ).    Has patient registered device? Yes Advised patient to register for repair or replacement on the [employer] website.  Patient can call [employer] at [phone] for additional support.    Consider use of bacterial filter with reassessment of pressure needs. You may need to get a new tube if your current tube is heated.  Sources for filters:  online sources or your DME. If you feel your symptoms are returning or you feel your pressures are insufficient , please change the filter and if issues continue reach out to your provider. Consider discontinuing using humidity with the filter.     Does the patient feel they still need to have further discussion with provider ?   No       Plan :     Patient wishes to  continue therapy. Recommendations are use of bacterial filter and consider discontinuing using humidity with the filter.

## 2021-07-16 NOTE — TELEPHONE ENCOUNTER
Patient calling sleep center wondering what to do about his recalled CPAP machine. Patient stated he has registered it with [employer] for the recall, but unsure what to do in the mean time. Please call patient back. Rosita Prakash CMA

## 2021-07-16 NOTE — LETTER
"July 19, 2021      Barbie Portillo  [id number] [facility] [address]        Dear ,    We are writing to inform you of your test results.     \"Test results are consistent from before.  No changes from what we decided at last visit.\"     Aric Stephens MD       Resulted Orders   Albumin Random Urine Quantitative with Creat Ratio   Result Value Ref Range    Creatinine Urine mg/dL 97 mg/dL    Albumin Urine mg/L 36 mg/dL    Albumin Urine mg/g Cr 37.11 (H) 0.00 - 17.00 mg/g Cr   Lipid panel reflex to direct LDL Fasting   Result Value Ref Range    Cholesterol 137 <200 mg/dL      Comment:      Age 0-19 years  Desirable: <170 mg/dL  Borderline high:  170-199 mg/dl  High:            >199 mg/dl    Age 20 years and older  Desirable: <200 mg/dL    Triglycerides 167 (H) <150 mg/dL      Comment:      0-9 years:  Normal:    Less than 75 mg/dL  Borderline high:  75-99 mg/dL  High:             Greater than or equal to 100 mg/dL    0-19 years:  Normal:    Less than 90 mg/dL  Borderline high:   mg/dL  High:             Greater than or equal to 130 mg/dL    20 years and older:  Normal:    Less than 150 mg/dL  Borderline high:  150-199 mg/dL  High:             200-499 mg/dL  Very high:   Greater than or equal to 500 mg/dL    Direct Measure HDL 45 >=40 mg/dL      Comment:      0-19 years:       Greater than or equal to 45 mg/dL   Low: Less than 40 mg/dL   Borderline low: 40-44 mg/dL     20 years and older:   Female: Greater than or equal to 50 mg/dL   Male:   Greater than or equal to 40 mg/dL         LDL Cholesterol Calculated 59 <=100 mg/dL      Comment:      Age 0-19 years:  Desirable: 0-110 mg/dL   Borderline high: 110-129 mg/dL   High: >= 130 mg/dL    Age 20 years and older:  Desirable: <100mg/dL  Above desirable: 100-129 mg/dL   Borderline high: 130-159 mg/dL   High: 160-189 mg/dL   Very high: >= 190 mg/dL    Non HDL Cholesterol 92 <130 mg/dL      Comment:      0-19 years:  Desirable:          Less " than 120 mg/dL  Borderline high:   120-144 mg/dL  High:                   Greater than or equal to 145 mg/dL    20 years and older:  Desirable:          130 mg/dL  Above Desirable: 130-159 mg/dL  Borderline high:   160-189 mg/dL  High:               190-219 mg/dL  Very high:     Greater than or equal to 220 mg/dL    Patient Fasting > 8hrs? Yes        If you have any questions or concerns, please call the clinic at the number listed above.

## 2021-07-16 NOTE — PROGRESS NOTES
Assessment & Plan     ASSESSMENT/ORDERS:    ICD-10-CM    1. Essential hypertension with goal blood pressure less than 140/90  I10 Albumin Random Urine Quantitative with Creat Ratio     Lipid panel reflex to direct LDL Fasting   2. SEVERE NASRIN (obstructive sleep apnea)  G47.33    3. Type 2 diabetes mellitus without complication, without long-term current use of insulin (H)  E11.9      PLAN:  1.  Patient's blood pressure not at goal, but he is using a 10 year old CPAP device due to his new device being recalled.  With his blood pressure close to goal and his blood pressure being at target 4 months ago, it is highly likely this is due to his obstructive sleep apnea and not a medication issue.  I have asked him to contact the sleep clinic for help on getting an updated CPAP device.  He will come in for a nurse visit for blood pressure recheck 1-2 weeks after he obtains a new CPAP device.                  No follow-ups on file.    Aric Stephens MD  [facility] is a 65 year old who presents for the following health issues     HPI     Hypertension Follow-up      Do you check your blood pressure regularly outside of the clinic? Yes     Are you following a low salt diet? Yes    Are your blood pressures ever more than 140 on the top number (systolic) OR more   than 90 on the bottom number (diastolic), for example 140/90? Yes      How many servings of fruits and vegetables do you eat daily?  4 or more    On average, how many sweetened beverages do you drink each day (Examples: soda, juice, sweet tea, etc.  Do NOT count diet or artificially sweetened beverages)?   0    How many days per week do you exercise enough to make your heart beat faster? 3 or less    How many minutes a day do you exercise enough to make your heart beat faster? 30 - 60    How many days per week do you miss taking your medication? 0      Patient informs me today that his CPAP device was recently  recalled 3 weeks ago.  He is using a 10 year old CPAP device while he waits for a new device.  Hasn't seen sleep clinic in a year and has not called them about this issue.  Waiting on the CPAP company for a resolution.      Blood sugars have been fine so far.      He needs labs for his hypertension that he has not done yet.    He has been taking his medications as prescribed.      Review of Systems         Objective    BP (!) 142/92   Pulse 58   Temp 97.5  F (36.4  C) (Temporal)   Resp 20   Wt 115.1 kg (253 lb 12.8 oz)   SpO2 100%   BMI 36.33 kg/m    Body mass index is 36.33 kg/m .  Physical Exam  Constitutional:       Appearance: He is well-developed. He is obese.   Cardiovascular:      Rate and Rhythm: Normal rate and regular rhythm.      Heart sounds: Normal heart sounds, S1 normal and S2 normal. No murmur heard.     Pulmonary:      Effort: Pulmonary effort is normal. No respiratory distress.      Breath sounds: Normal breath sounds. No wheezing, rhonchi or rales.   Neurological:      Mental Status: He is alert.

## 2021-07-16 NOTE — RESULT ENCOUNTER NOTE
"Will have staff send letter to patient with message regarding recent results:  \"Test results are consistent from before.  No changes from what we decided at last visit.\"    Aric Stephens MD "

## 2021-09-03 NOTE — PROGRESS NOTES
Does Barbie have a CPAP/Bipap?  Yes     Type of mask: nasal     [facility]: [facility] [phone]    [email or web address]     [place] Sleep Scale: 2    Barbie to follow up with Primary Care provider regarding elevated blood pressure.    Obstructive Sleep Apnea - PAP Follow-Up Visit:    Chief Complaint   Patient presents with     CPAP Follow Up       Barbie Portillo comes in today for follow-up of their severe sleep apnea, managed with CPAP.     No specialty comments available.    Overall, he rates the experience with PAP as 10 (0 poor, 10 great). The mask is comfortable. The mask is not leaking .  He is not snoring with the mask on. He is not having gasp arousals.  He is not having significant oral/nasal dryness. The pressure is comfortable.    His PAP interface is Nasal Mask.    Bedtime is typically 11. Usually it takes about 5 minutes to fall asleep with the mask on. Wake time is typically 7.  Patient is using PAP therapy 6-7 hours per night. The patient is usually getting 6-7 hours of sleep per night.    He does feel rested in the morning.    [place] Sleepiness Scale: 2/24      No data recorded    [employer]Med     Respironics  CPAP 8 cmH2O 30 day usage data:  100% of days with > 4 hours of use. 0/30 days with no use.   Average use 6 hours 26 minutes per day.     Average time in large leak 32 sec.    AHI 1.1 events per hour.             Past medical/surgical history, family history, social history, medications and allergies were reviewed.      Problem List:  Patient Active Problem List    Diagnosis Date Noted     Primary osteoarthritis of left hip 01/30/2020     Priority: Medium     Counseling regarding advanced directives 07/01/2019     Priority: Medium     Severe obesity (BMI 35.0-39.9) with comorbidity (H) 12/18/2018     Priority: Medium     Type 2 diabetes mellitus without complication, without long-term current use of insulin (H) 07/14/2015     Priority: Medium      SEVERE NASRIN (obstructive sleep apnea) 06/04/2013     Priority: Medium     [place]  5/29/2013  Severe AHI 97.5  Oxygen Andrzej 84%  CPAP 8cmH2O       Essential hypertension with goal blood pressure less than 140/90 04/18/2011     Priority: Medium        There were no vitals taken for this visit.    Impression/Plan:     Severe Sleep apnea. He is Tolerating PAP well. Daytime symptoms are stable. Face to visit visit completed for new medicare patient.   He has been using his old cpap due to recall on his newer machine. He has just received a replacement machine and will need new supplies. He has excellent compliance, apnea is well controlled.     Barbie Portillo will follow up in about 1 year(s).     Fifteen minutes spent with patient, all of which were spent face-to-face counseling, consulting, coordinating plan of care.      CC:  Aric Stephens,

## 2021-09-07 ENCOUNTER — OFFICE VISIT (OUTPATIENT)
Dept: SLEEP MEDICINE | Facility: CLINIC | Age: 66
End: 2021-09-07
Payer: COMMERCIAL

## 2021-09-07 VITALS
HEART RATE: 71 BPM | DIASTOLIC BLOOD PRESSURE: 88 MMHG | HEIGHT: 71 IN | SYSTOLIC BLOOD PRESSURE: 148 MMHG | BODY MASS INDEX: 36.6 KG/M2 | OXYGEN SATURATION: 98 % | WEIGHT: 261.4 LBS

## 2021-09-07 DIAGNOSIS — G47.33 OSA (OBSTRUCTIVE SLEEP APNEA): Primary | ICD-10-CM

## 2021-09-07 PROCEDURE — 99213 OFFICE O/P EST LOW 20 MIN: CPT | Performed by: PHYSICIAN ASSISTANT

## 2021-09-07 ASSESSMENT — MIFFLIN-ST. JEOR: SCORE: 1987.83

## 2022-07-11 ENCOUNTER — TELEPHONE (OUTPATIENT)
Dept: FAMILY MEDICINE | Facility: CLINIC | Age: 67
End: 2022-07-11

## 2022-07-11 NOTE — TELEPHONE ENCOUNTER
Reason for Call:  Medication or medication refill:    Do you use a [facility]?  Name of the pharmacy and phone number for the current request:  [facility] - [phone]    Name of the medication requested: all of his blood pressure medications    Other request: none    Can we leave a detailed message on this number? YES    Phone number patient can be reached at: Home number on file [phone] (home)    Best Time: any    Call taken on 7/11/2022 at 12:32 PM by Amy Fontana

## 2022-07-22 DIAGNOSIS — I10 ESSENTIAL HYPERTENSION WITH GOAL BLOOD PRESSURE LESS THAN 140/90: ICD-10-CM

## 2022-07-22 RX ORDER — METOPROLOL SUCCINATE 200 MG/1
200 TABLET, EXTENDED RELEASE ORAL DAILY
Qty: 90 TABLET | Refills: 0 | Status: SHIPPED | OUTPATIENT
Start: 2022-07-22 | End: 2022-08-24

## 2022-07-22 RX ORDER — LISINOPRIL 40 MG/1
40 TABLET ORAL DAILY
Qty: 90 TABLET | Refills: 0 | Status: SHIPPED | OUTPATIENT
Start: 2022-07-22 | End: 2022-08-24

## 2022-07-22 NOTE — TELEPHONE ENCOUNTER
Pharmacy calling to inquirer on why the refill request was denied? Further questioning and pharmacy states the prescription on both the lisinopril and the metoprolol  last month and patient is needing new prescriptions sent to the pharmacy. Has appointment scheduled with provider on 22. Jackelin Gee LPN

## 2022-07-25 RX ORDER — METOPROLOL SUCCINATE 200 MG/1
200 TABLET, EXTENDED RELEASE ORAL DAILY
Qty: 90 TABLET | Refills: 4 | OUTPATIENT
Start: 2022-07-25

## 2022-07-25 RX ORDER — LISINOPRIL 40 MG/1
40 TABLET ORAL DAILY
Qty: 90 TABLET | Refills: 4 | OUTPATIENT
Start: 2022-07-25

## 2022-07-25 NOTE — TELEPHONE ENCOUNTER
Routing refill request to provider for review/approval because:  Labs not current:  CRE, K+. Na+  Patient needs to be seen because it has been more than 1 year since last office visit.  BP elevated      DEJA KimN, RN

## 2022-07-26 RX ORDER — HYDROCHLOROTHIAZIDE 25 MG/1
25 TABLET ORAL EVERY MORNING
Qty: 90 TABLET | Refills: 0 | Status: SHIPPED | OUTPATIENT
Start: 2022-07-26 | End: 2022-08-24

## 2022-08-24 ENCOUNTER — OFFICE VISIT (OUTPATIENT)
Dept: FAMILY MEDICINE | Facility: CLINIC | Age: 67
End: 2022-08-24
Payer: COMMERCIAL

## 2022-08-24 VITALS
DIASTOLIC BLOOD PRESSURE: 92 MMHG | SYSTOLIC BLOOD PRESSURE: 150 MMHG | BODY MASS INDEX: 36.36 KG/M2 | OXYGEN SATURATION: 97 % | TEMPERATURE: 98.1 F | HEIGHT: 70 IN | HEART RATE: 70 BPM | WEIGHT: 254 LBS

## 2022-08-24 DIAGNOSIS — I10 ESSENTIAL HYPERTENSION WITH GOAL BLOOD PRESSURE LESS THAN 140/90: ICD-10-CM

## 2022-08-24 DIAGNOSIS — E66.01 SEVERE OBESITY (BMI 35.0-39.9) WITH COMORBIDITY (H): ICD-10-CM

## 2022-08-24 DIAGNOSIS — E11.9 TYPE 2 DIABETES MELLITUS WITHOUT COMPLICATION, WITHOUT LONG-TERM CURRENT USE OF INSULIN (H): Primary | ICD-10-CM

## 2022-08-24 LAB
ALT SERPL W P-5'-P-CCNC: 48 U/L (ref 0–70)
ANION GAP SERPL CALCULATED.3IONS-SCNC: 8 MMOL/L (ref 3–14)
BUN SERPL-MCNC: 18 MG/DL (ref 7–30)
CALCIUM SERPL-MCNC: 9.1 MG/DL (ref 8.5–10.1)
CHLORIDE BLD-SCNC: 108 MMOL/L (ref 94–109)
CHOLEST SERPL-MCNC: 124 MG/DL
CO2 SERPL-SCNC: 25 MMOL/L (ref 20–32)
CREAT SERPL-MCNC: 0.79 MG/DL (ref 0.66–1.25)
CREAT UR-MCNC: 190 MG/DL
FASTING STATUS PATIENT QL REPORTED: YES
GFR SERPL CREATININE-BSD FRML MDRD: >90 ML/MIN/1.73M2
GLUCOSE BLD-MCNC: 138 MG/DL (ref 70–99)
HBA1C MFR BLD: 7.1 % (ref 0–5.6)
HDLC SERPL-MCNC: 42 MG/DL
LDLC SERPL CALC-MCNC: 52 MG/DL
MICROALBUMIN UR-MCNC: 42 MG/L
MICROALBUMIN/CREAT UR: 22.11 MG/G CR (ref 0–17)
NONHDLC SERPL-MCNC: 82 MG/DL
POTASSIUM BLD-SCNC: 3.5 MMOL/L (ref 3.4–5.3)
SODIUM SERPL-SCNC: 141 MMOL/L (ref 133–144)
TRIGL SERPL-MCNC: 148 MG/DL
TSH SERPL DL<=0.005 MIU/L-ACNC: 2.02 MU/L (ref 0.4–4)

## 2022-08-24 PROCEDURE — 80061 LIPID PANEL: CPT | Performed by: FAMILY MEDICINE

## 2022-08-24 PROCEDURE — 84443 ASSAY THYROID STIM HORMONE: CPT | Performed by: FAMILY MEDICINE

## 2022-08-24 PROCEDURE — 83036 HEMOGLOBIN GLYCOSYLATED A1C: CPT | Performed by: FAMILY MEDICINE

## 2022-08-24 PROCEDURE — 99214 OFFICE O/P EST MOD 30 MIN: CPT | Performed by: FAMILY MEDICINE

## 2022-08-24 PROCEDURE — 82043 UR ALBUMIN QUANTITATIVE: CPT | Performed by: FAMILY MEDICINE

## 2022-08-24 PROCEDURE — 36415 COLL VENOUS BLD VENIPUNCTURE: CPT | Performed by: FAMILY MEDICINE

## 2022-08-24 PROCEDURE — 84460 ALANINE AMINO (ALT) (SGPT): CPT | Performed by: FAMILY MEDICINE

## 2022-08-24 PROCEDURE — 80048 BASIC METABOLIC PNL TOTAL CA: CPT | Performed by: FAMILY MEDICINE

## 2022-08-24 RX ORDER — METOPROLOL SUCCINATE 200 MG/1
200 TABLET, EXTENDED RELEASE ORAL DAILY
Qty: 90 TABLET | Refills: 3 | Status: SHIPPED | OUTPATIENT
Start: 2022-08-24 | End: 2022-11-21

## 2022-08-24 RX ORDER — AMLODIPINE BESYLATE 5 MG/1
TABLET ORAL
Qty: 90 TABLET | Refills: 0 | Status: SHIPPED | OUTPATIENT
Start: 2022-08-24 | End: 2022-11-21 | Stop reason: SINTOL

## 2022-08-24 RX ORDER — HYDROCHLOROTHIAZIDE 25 MG/1
25 TABLET ORAL EVERY MORNING
Qty: 90 TABLET | Refills: 3 | Status: SHIPPED | OUTPATIENT
Start: 2022-08-24 | End: 2022-11-21

## 2022-08-24 RX ORDER — BLOOD-GLUCOSE METER
EACH MISCELLANEOUS
Qty: 100 EACH | Refills: 3 | Status: SHIPPED | OUTPATIENT
Start: 2022-08-24 | End: 2023-12-19

## 2022-08-24 RX ORDER — LISINOPRIL 40 MG/1
40 TABLET ORAL DAILY
Qty: 90 TABLET | Refills: 3 | Status: SHIPPED | OUTPATIENT
Start: 2022-08-24 | End: 2022-11-21

## 2022-08-24 ASSESSMENT — PATIENT HEALTH QUESTIONNAIRE - PHQ9
10. IF YOU CHECKED OFF ANY PROBLEMS, HOW DIFFICULT HAVE THESE PROBLEMS MADE IT FOR YOU TO DO YOUR WORK, TAKE CARE OF THINGS AT HOME, OR GET ALONG WITH OTHER PEOPLE: NOT DIFFICULT AT ALL
SUM OF ALL RESPONSES TO PHQ QUESTIONS 1-9: 3
SUM OF ALL RESPONSES TO PHQ QUESTIONS 1-9: 3

## 2022-08-24 ASSESSMENT — PAIN SCALES - GENERAL: PAINLEVEL: MILD PAIN (3)

## 2022-08-24 NOTE — PROGRESS NOTES
Assessment & Plan       ICD-10-CM    1. Type 2 diabetes mellitus without complication, without long-term current use of insulin (H)  E11.9 HEMOGLOBIN A1C     BASIC METABOLIC PANEL     REVIEW OF HEALTH MAINTENANCE PROTOCOL ORDERS     Lipid panel reflex to direct LDL Non-fasting     Albumin Random Urine Quantitative with Creat Ratio     ALT     blood glucose (NO BRAND SPECIFIED) test strip     blood glucose (DUQI.COMCAN FINEPOINT) lancets     HEMOGLOBIN A1C     BASIC METABOLIC PANEL     Lipid panel reflex to direct LDL Non-fasting     Albumin Random Urine Quantitative with Creat Ratio     ALT     TSH with free T4 reflex   2. Essential hypertension with goal blood pressure less than 140/90  I10 BASIC METABOLIC PANEL     Albumin Random Urine Quantitative with Creat Ratio     hydrochlorothiazide (HYDRODIURIL) 25 MG tablet     lisinopril (ZESTRIL) 40 MG tablet     metoprolol succinate ER (TOPROL XL) 200 MG 24 hr tablet     amLODIPine (NORVASC) 5 MG tablet     TSH with free T4 reflex     BASIC METABOLIC PANEL     Albumin Random Urine Quantitative with Creat Ratio     TSH with free T4 reflex   3. Severe obesity (BMI 35.0-39.9) with comorbidity (H)  E66.01         We talked at length about the recommendations for diabetic management.  We talked about the recommendations to be on cholesterol medication, aspirin, ACE inhibitor, and then diabetic medications as needed for glucose control.  We discussed the range of A1c goals.  In the past he has kept his A1c at goal without medication.  He thinks it will be higher now.  Checking all his routine labs today and will notify him with results.  In the past his primary doctor has elected not to put him on antihyperlipidemic therapy because of his excellent cholesterol profile, and we discussed that this is an option based on his results today.    His blood pressure is not at goal he had significant improvement in his blood pressure in the past with weight loss, but has regained his  "weight.  We discussed the recommendation to get a month additional medication, and will resume his amlodipine which he used successfully in the past.  I did discuss the risk of edema with this.  I would like him to come back in 1 to 2 months with his primary and recheck his blood pressure and monitor for side effects.  He was hoping to have a physical today but was not scheduled as such, so I encouraged him to schedule a physical in the next month or 2 and can do his blood pressure recheck at that time.    We discussed his weight getting his weight under control should be much easier now that he is retired and has more free time.  He states that his wife is a very good cook and he does not want to insult her by not eating her food.  I put some patient instructions and that would highlight the need for weight loss and healthier eating.  With weight loss he likely could keep his diabetes under control with diet alone and get his blood pressure down.    He declines any vaccines today.  Specifically offered COVID and pneumonia vaccine and he declined.  30 minutes spent on the date of the encounter doing chart review, history and exam, documentation and further activities per the note       BMI:   Estimated body mass index is 36.97 kg/m  as calculated from the following:    Height as of this encounter: 1.765 m (5' 9.5\").    Weight as of this encounter: 115.2 kg (254 lb).   Weight management plan: Discussed healthy diet and exercise guidelines    See patient instructions:    Patient Instructions   I will notify you with lab results from today.     I recommend weight loss to get you to a healthy body mass index (BMI) under 30. Getting your weight down to 205 lbs or less would be a good goal over time.   This might help you to become NOT diabetic, and also keep your blood pressure down in the healthy range. This will lower your risk of cardiovascular disease such as heart attack and stroke.     Start back on the Amlodipine " 5 mg once daily IN ADDITION TO your previous medications.   I'd like you to see Dr. Perdomo back in 1-2 months and recheck your blood pressure. As you lose weight, your blood pressure should come back down into the normal range more easily.         Return for 1-2 months with Dr. perdomo for annual exam and blood pressure recheck.    Maribell Ballesteros MD  [facility] is a 67 year old male, presenting for the following health issues:  Recheck Medication      History of Present Illness       Diabetes:   He presents for follow up of diabetes.  He is checking home blood glucose a few times a week. He checks blood glucose before meals.  Blood glucose is never over 200 and never under 70. When his blood glucose is low, the patient is asymptomatic for confusion, blurred vision, lethargy and reports not feeling dizzy, shaky, or weak.  He has no concerns regarding his diabetes at this time.  He is not experiencing numbness or burning in feet, excessive thirst, blurry vision, weight changes or redness, sores or blisters on feet. The patient has not had a diabetic eye exam in the last 12 months.         Hypertension: He presents for follow up of hypertension.  He does check blood pressure  regularly outside of the clinic. Outside blood pressures have been over 140/90. He follows a low salt diet.      Today's PHQ-9         PHQ-9 Total Score: 3    PHQ-9 Q9 Thoughts of better off dead/self-harm past 2 weeks :   Not at all    How difficult have these problems made it for you to do your work, take care of things at home, or get along with other people: Not difficult at all     Overall he is doing well.  He has type 2 diabetes and is seeing me today for follow-up as his PCP is out of office.  He checks his blood sugar occasionally about once to twice a week.  Usually in the morning his fasting glucose is between 130 and 150.  He is not on diabetic medication.  In the past he  "has had difficulty maintaining his healthy lifestyle because of his busyness at work, but now he is retired.  He states that he eats too much because his wife is an excellent cook.    His blood pressure has been running high as well.  He is currently on lisinopril metoprolol, and hydrochlorothiazide.  He is taking his aspirin.  He is not on a statin, has discussed this with his PCP in the past.  His cholesterol has been excellent and his A1c was under goal so he was preferring not to be on a cholesterol medication.    In the past he dealt with significant left knee arthritis but has since had a total knee replacement and is doing much better.    Review of Systems   Constitutional, HEENT, cardiovascular, pulmonary, gi and gu systems are negative, except as otherwise noted.      Objective    BP (!) 150/92   Pulse 70   Temp 98.1  F (36.7  C) (Temporal)   Ht 1.765 m (5' 9.5\")   Wt 115.2 kg (254 lb)   SpO2 97%   BMI 36.97 kg/m    Body mass index is 36.97 kg/m .  Physical Exam   Vitals noted.  Patient alert, oriented, and in no acute distress.   Neck:  Supple without lymphadenopathy, JVD or masses. No bruits.   CV:  RRR without murmur.   Respiratory:  Lungs clear to auscultation bilaterally.   Extremities warm and dry without cyanosis, clubbing or edema.     Orders Placed This Encounter   Procedures     REVIEW OF HEALTH MAINTENANCE PROTOCOL ORDERS     HEMOGLOBIN A1C     BASIC METABOLIC PANEL     Lipid panel reflex to direct LDL Non-fasting     Albumin Random Urine Quantitative with Creat Ratio     ALT     TSH with free T4 reflex                .  ..  "

## 2022-08-24 NOTE — PATIENT INSTRUCTIONS
I will notify you with lab results from today.     I recommend weight loss to get you to a healthy body mass index (BMI) under 30. Getting your weight down to 205 lbs or less would be a good goal over time.   This might help you to become NOT diabetic, and also keep your blood pressure down in the healthy range. This will lower your risk of cardiovascular disease such as heart attack and stroke.     Start back on the Amlodipine 5 mg once daily IN ADDITION TO your previous medications.   I'd like you to see Dr. Stephens back in 1-2 months and recheck your blood pressure. As you lose weight, your blood pressure should come back down into the normal range more easily.

## 2022-08-25 ENCOUNTER — TELEPHONE (OUTPATIENT)
Dept: FAMILY MEDICINE | Facility: CLINIC | Age: 67
End: 2022-08-25

## 2022-08-25 RX ORDER — METFORMIN HCL 500 MG
500 TABLET, EXTENDED RELEASE 24 HR ORAL
Qty: 90 TABLET | Refills: 1 | Status: SHIPPED | OUTPATIENT
Start: 2022-08-25 | End: 2022-11-21

## 2022-08-25 NOTE — RESULT ENCOUNTER NOTE
Please call and notify Barbie that his urine protein level is abnormal but same as it has been in the past 3 years. His thyroid level is perfect. His liver and kidney blood tests are normal. His cholesterol looks really good, but his A1C test for diabetes and his sugar are higher than they should be. The A1C went up to 7.1, so he could benefit from a diabetes pill called Metformin. I recommend 1 pill daily and then he come back in to see Dr. Stephens in 3 months and have it rechecked. Send copy of labs.   Maribell Ballesteros MD

## 2022-08-25 NOTE — TELEPHONE ENCOUNTER
----- Message from Maribell Ballesteros MD sent at 8/25/2022 12:44 AM CDT -----  Please call and notify Barbie that his urine protein level is abnormal but same as it has been in the past 3 years. His thyroid level is perfect. His liver and kidney blood tests are normal. His cholesterol looks really good, but his A1C test for diabetes and his sugar are higher than they should be. The A1C went up to 7.1, so he could benefit from a diabetes pill called Metformin. I recommend 1 pill daily and then he come back in to see Dr. Stephens in 3 months and have it rechecked. Send copy of labs.   Maribell Ballesteros MD

## 2022-08-25 NOTE — TELEPHONE ENCOUNTER
Spoke with patient and informed of results below. Patient understood. Labs have been mailed as well.     Liudmila Bond MA

## 2022-08-25 NOTE — LETTER
August 25, 2022      Barbie Portillo  [id number] [facility] [id number]        Dear ,    We are writing to inform you of your test results.    Urine protein level is abnormal but same as it has been in the past 3 years. His thyroid level is perfect. His liver and kidney blood tests are normal. His cholesterol looks really good, but his A1C test for diabetes and his sugar are higher than they should be. The A1C went up to 7.1, so he could benefit from a diabetes pill called Metformin. I recommend 1 pill daily and then he come back in to see Dr. Stephens in 3 months and have it rechecked. Send copy of labs.     Maribell Ballesteros MD       Resulted Orders   HEMOGLOBIN A1C   Result Value Ref Range    Hemoglobin A1C 7.1 (H) 0.0 - 5.6 %      Comment:      Normal <5.7%   Prediabetes 5.7-6.4%    Diabetes 6.5% or higher     Note: Adopted from ADA consensus guidelines.   BASIC METABOLIC PANEL   Result Value Ref Range    Sodium 141 133 - 144 mmol/L    Potassium 3.5 3.4 - 5.3 mmol/L    Chloride 108 94 - 109 mmol/L    Carbon Dioxide (CO2) 25 20 - 32 mmol/L    Anion Gap 8 3 - 14 mmol/L    Urea Nitrogen 18 7 - 30 mg/dL    Creatinine 0.79 0.66 - 1.25 mg/dL    Calcium 9.1 8.5 - 10.1 mg/dL    Glucose 138 (H) 70 - 99 mg/dL    GFR Estimate >90 >60 mL/min/1.73m2      Comment:      Effective December 21, 2021 eGFRcr in adults is calculated using the 2021 CKD-EPI creatinine equation which includes age and gender (Ashley yates al., NEJM, DOI: 10.1056/SDECva7851205)   Lipid panel reflex to direct LDL Non-fasting   Result Value Ref Range    Cholesterol 124 <200 mg/dL    Triglycerides 148 <150 mg/dL    Direct Measure HDL 42 >=40 mg/dL    LDL Cholesterol Calculated 52 <=100 mg/dL    Non HDL Cholesterol 82 <130 mg/dL    Patient Fasting > 8hrs? Yes     Narrative    Cholesterol  Desirable:  <200 mg/dL    Triglycerides  Normal:  Less than 150 mg/dL  Borderline High:  150-199 mg/dL  High:  200-499 mg/dL  Very High:  Greater  than or equal to 500 mg/dL    Direct Measure HDL  Female:  Greater than or equal to 50 mg/dL   Male:  Greater than or equal to 40 mg/dL    LDL Cholesterol  Desirable:  <100mg/dL  Above Desirable:  100-129 mg/dL   Borderline High:  130-159 mg/dL   High:  160-189 mg/dL   Very High:  >= 190 mg/dL    Non HDL Cholesterol  Desirable:  130 mg/dL  Above Desirable:  130-159 mg/dL  Borderline High:  160-189 mg/dL  High:  190-219 mg/dL  Very High:  Greater than or equal to 220 mg/dL   Albumin Random Urine Quantitative with Creat Ratio   Result Value Ref Range    Creatinine Urine mg/dL 190 mg/dL    Albumin Urine mg/L 42 mg/L    Albumin Urine mg/g Cr 22.11 (H) 0.00 - 17.00 mg/g Cr   ALT   Result Value Ref Range    ALT 48 0 - 70 U/L   TSH with free T4 reflex   Result Value Ref Range    TSH 2.02 0.40 - 4.00 mU/L       If you have any questions or concerns, please call the clinic at the number listed above.

## 2022-08-25 NOTE — TELEPHONE ENCOUNTER
Patient currently has Accu-Chek diabetic testing supplies, but his insurance does not cover Accu-Chek test strips. Patient would like to switch to One Touch, which his insurance does cover. We have prescriptions for One Touch lancets and test strips, but not a prescription for a glucometer yet. Please send new prescription for glucometer. Thank you.

## 2022-08-26 DIAGNOSIS — E11.9 TYPE 2 DIABETES MELLITUS WITHOUT COMPLICATION, WITHOUT LONG-TERM CURRENT USE OF INSULIN (H): Primary | ICD-10-CM

## 2022-11-21 ENCOUNTER — OFFICE VISIT (OUTPATIENT)
Dept: FAMILY MEDICINE | Facility: CLINIC | Age: 67
End: 2022-11-21
Payer: COMMERCIAL

## 2022-11-21 VITALS
DIASTOLIC BLOOD PRESSURE: 88 MMHG | OXYGEN SATURATION: 98 % | SYSTOLIC BLOOD PRESSURE: 150 MMHG | HEIGHT: 70 IN | BODY MASS INDEX: 35.27 KG/M2 | HEART RATE: 64 BPM | WEIGHT: 246.38 LBS | TEMPERATURE: 98.3 F

## 2022-11-21 DIAGNOSIS — I10 ESSENTIAL HYPERTENSION WITH GOAL BLOOD PRESSURE LESS THAN 140/90: ICD-10-CM

## 2022-11-21 DIAGNOSIS — E11.9 TYPE 2 DIABETES MELLITUS WITHOUT COMPLICATION, WITHOUT LONG-TERM CURRENT USE OF INSULIN (H): ICD-10-CM

## 2022-11-21 DIAGNOSIS — Z00.00 ENCOUNTER FOR MEDICARE ANNUAL WELLNESS EXAM: Primary | ICD-10-CM

## 2022-11-21 PROBLEM — Z71.89 COUNSELING REGARDING ADVANCED DIRECTIVES: Status: RESOLVED | Noted: 2019-07-01 | Resolved: 2022-11-21

## 2022-11-21 PROCEDURE — G0439 PPPS, SUBSEQ VISIT: HCPCS | Performed by: FAMILY MEDICINE

## 2022-11-21 PROCEDURE — 99214 OFFICE O/P EST MOD 30 MIN: CPT | Mod: 25 | Performed by: FAMILY MEDICINE

## 2022-11-21 RX ORDER — HYDROCHLOROTHIAZIDE 25 MG/1
25 TABLET ORAL EVERY MORNING
Qty: 90 TABLET | Refills: 3 | Status: SHIPPED | OUTPATIENT
Start: 2022-11-21 | End: 2023-12-05

## 2022-11-21 RX ORDER — SPIRONOLACTONE 25 MG/1
25 TABLET ORAL DAILY
Qty: 30 TABLET | Refills: 1 | Status: SHIPPED | OUTPATIENT
Start: 2022-11-21 | End: 2022-12-19

## 2022-11-21 RX ORDER — LISINOPRIL 40 MG/1
40 TABLET ORAL DAILY
Qty: 90 TABLET | Refills: 3 | Status: SHIPPED | OUTPATIENT
Start: 2022-11-21 | End: 2023-12-05

## 2022-11-21 RX ORDER — METOPROLOL SUCCINATE 200 MG/1
200 TABLET, EXTENDED RELEASE ORAL DAILY
Qty: 90 TABLET | Refills: 3 | Status: SHIPPED | OUTPATIENT
Start: 2022-11-21 | End: 2023-12-05

## 2022-11-21 ASSESSMENT — ENCOUNTER SYMPTOMS
DIZZINESS: 0
HEMATURIA: 0
JOINT SWELLING: 0
ARTHRALGIAS: 0
MYALGIAS: 0
FEVER: 0
SHORTNESS OF BREATH: 0
WEAKNESS: 0
DIARRHEA: 0
HEADACHES: 0
PALPITATIONS: 0
FREQUENCY: 1
EYE PAIN: 0
NERVOUS/ANXIOUS: 0
COUGH: 0
HEARTBURN: 0
SORE THROAT: 0
ABDOMINAL PAIN: 0
CHILLS: 0
NAUSEA: 0
CONSTIPATION: 0
PARESTHESIAS: 0
DYSURIA: 0
HEMATOCHEZIA: 0

## 2022-11-21 ASSESSMENT — ACTIVITIES OF DAILY LIVING (ADL): CURRENT_FUNCTION: NO ASSISTANCE NEEDED

## 2022-11-21 ASSESSMENT — PAIN SCALES - GENERAL: PAINLEVEL: MILD PAIN (2)

## 2022-11-21 NOTE — PROGRESS NOTES
"SUBJECTIVE:   Barbie is a 67 year old who presents for Preventive Visit.    Patient has been advised of split billing requirements and indicates understanding: Yes  Are you in the first 12 months of your Medicare coverage?  No    Healthy Habits:     In general, how would you rate your overall health?  Good    Frequency of exercise:  2-3 days/week    Duration of exercise:  15-30 minutes    Do you usually eat at least 4 servings of fruit and vegetables a day, include whole grains    & fiber and avoid regularly eating high fat or \"junk\" foods?  Yes    Taking medications regularly:  Yes    Medication side effects:  Lightheadedness    Ability to successfully perform activities of daily living:  No assistance needed    Home Safety:  No safety concerns identified    Hearing Impairment:  No hearing concerns    In the past 6 months, have you been bothered by leaking of urine?  No    In general, how would you rate your overall mental or emotional health?  Good      PHQ-2 Total Score: 0    Additional concerns today:  No    Blood pressure not at goal.  Did not tolerate amlodipine; got hives.  Systolic blood pressure at home 145-150s.      Not taking metformin; working on weight loss.  Improving diet, no added sugars.    Have you ever done Advance Care Planning? (For example, a Health Directive, POLST, or a discussion with a medical provider or your loved ones about your wishes): Yes, patient states has an Advance Care Planning document and will bring a copy to the clinic.       Fall risk  Fallen 2 or more times in the past year?: No  Any fall with injury in the past year?: No    Cognitive Screening   1) Repeat 3 items (Leader, Season, Table)    2) Clock draw: NORMAL  3) 3 item recall: Recalls 2 objects   Results: NORMAL clock, 1-2 items recalled: COGNITIVE IMPAIRMENT LESS LIKELY    Mini-CogTM Copyright S [employer]. Licensed by the author for use in [facility]; reprinted with permission (dylan@.[facility]). All rights " reserved.      Do you have sleep apnea, excessive snoring or daytime drowsiness?: yes    Reviewed and updated as needed this visit by clinical staff   Tobacco   Meds              Reviewed and updated as needed this visit by Provider                 Social History     Tobacco Use     Smoking status: Never     Smokeless tobacco: Never   Substance Use Topics     Alcohol use: No         Alcohol Use 11/21/2022   Prescreen: >3 drinks/day or >7 drinks/week? Not Applicable   Prescreen: >3 drinks/day or >7 drinks/week? -           Current providers sharing in care for this patient include:   Patient Care Team:  Aric Stephens MD as PCP - General (Family Practice)  Aric Stephens MD as Assigned PCP  Arnav Arce PA-C as Assigned Sleep Provider    The following health maintenance items are reviewed in Epic and correct as of today:  Health Maintenance   Topic Date Due     COVID-19 Vaccine (1) Never done     EYE EXAM  06/03/2020     MEDICARE ANNUAL WELLNESS VISIT  06/15/2022     DIABETIC FOOT EXAM  06/15/2022     INFLUENZA VACCINE (1) 09/01/2022     Pneumococcal Vaccine: 65+ Years (2 - PCV) 06/15/2022     A1C  02/24/2023     BMP  08/24/2023     LIPID  08/24/2023     MICROALBUMIN  08/24/2023     ANNUAL REVIEW OF HM ORDERS  08/24/2023     FALL RISK ASSESSMENT  11/21/2023     ADVANCE CARE PLANNING  07/16/2026     DTAP/TDAP/TD IMMUNIZATION (3 - Td or Tdap) 06/28/2028     COLORECTAL CANCER SCREENING  01/15/2029     HEPATITIS C SCREENING  Completed     PHQ-2 (once per calendar year)  Completed     ZOSTER IMMUNIZATION  Completed     AORTIC ANEURYSM SCREENING (SYSTEM ASSIGNED)  Completed     IPV IMMUNIZATION  Aged Out     MENINGITIS IMMUNIZATION  Aged Out               Review of Systems   Constitutional: Negative for chills and fever.   HENT: Negative for congestion, ear pain, hearing loss and sore throat.    Eyes: Negative for pain and visual disturbance.   Respiratory: Negative for cough and shortness of  "breath.    Cardiovascular: Positive for chest pain and peripheral edema. Negative for palpitations.   Gastrointestinal: Negative for abdominal pain, constipation, diarrhea, heartburn, hematochezia and nausea.   Genitourinary: Positive for frequency and urgency. Negative for dysuria, genital sores, hematuria, impotence and penile discharge.   Musculoskeletal: Negative for arthralgias, joint swelling and myalgias.   Skin: Negative for rash.   Neurological: Negative for dizziness, weakness, headaches and paresthesias.   Psychiatric/Behavioral: Negative for mood changes. The patient is not nervous/anxious.          OBJECTIVE:   BP (!) 150/88   Pulse 64   Temp 98.3  F (36.8  C) (Temporal)   Ht 1.772 m (5' 9.75\")   Wt 111.8 kg (246 lb 6 oz)   SpO2 98%   BMI 35.60 kg/m   Estimated body mass index is 35.6 kg/m  as calculated from the following:    Height as of this encounter: 1.772 m (5' 9.75\").    Weight as of this encounter: 111.8 kg (246 lb 6 oz).  Physical Exam  Constitutional:       General: He is not in acute distress.     Appearance: He is well-developed and well-nourished.   HENT:      Head: Normocephalic and atraumatic.      Right Ear: Hearing, tympanic membrane, ear canal and external ear normal.      Left Ear: Hearing, tympanic membrane, ear canal and external ear normal.      Nose: Nose normal.      Mouth/Throat:      Mouth: No oral lesions.      Pharynx: Uvula midline. No oropharyngeal exudate.   Eyes:      General: Lids are normal. No scleral icterus.        Right eye: No discharge.         Left eye: No discharge.      Extraocular Movements: Extraocular movements intact.      Conjunctiva/sclera: Conjunctivae normal.      Pupils: Pupils are equal, round, and reactive to light.   Neck:      Thyroid: No thyroid mass or thyromegaly.      Trachea: No tracheal deviation.   Cardiovascular:      Rate and Rhythm: Normal rate and regular rhythm.      Pulses: Normal pulses.      Heart sounds: Normal heart sounds, " S1 normal and S2 normal. No murmur heard.    No S3 or S4 sounds.   Pulmonary:      Effort: Pulmonary effort is normal. No respiratory distress.      Breath sounds: Normal breath sounds. No wheezing, rhonchi or rales.   Abdominal:      General: Bowel sounds are normal. There is no distension.      Palpations: Abdomen is soft. There is no mass.      Tenderness: There is no abdominal tenderness. There is no guarding.   Musculoskeletal:         General: No deformity or edema. Normal range of motion.      Cervical back: Normal range of motion and neck supple.      Comments: FEET:  monofilament exam normal bilaterally.  Good hair growth.  Dorsalis pedis pulses 2+ bilaterally.  Feet warm.    Feet:      Right foot:      Skin integrity: No ulcer, blister, skin breakdown or erythema.      Left foot:      Skin integrity: No ulcer, blister, skin breakdown or erythema.   Lymphadenopathy:      Cervical: No cervical adenopathy.      Upper Body:      Right upper body: No supraclavicular adenopathy.      Left upper body: No supraclavicular adenopathy.   Skin:     General: Skin is warm and dry.      Findings: No lesion or rash.   Neurological:      Mental Status: He is alert and oriented to person, place, and time.      Motor: No abnormal muscle tone.      Deep Tendon Reflexes: Reflexes are normal and symmetric.   Psychiatric:         Speech: Speech normal.         Thought Content: Thought content normal.         Judgment: Judgment normal.               ASSESSMENT / PLAN:     ASSESSMENT/ORDERS:    ICD-10-CM    1. Encounter for Medicare annual wellness exam  Z00.00       2. Type 2 diabetes mellitus without complication, without long-term current use of insulin (H)  E11.9 Hemoglobin A1c      3. Essential hypertension with goal blood pressure less than 140/90  I10 hydrochlorothiazide (HYDRODIURIL) 25 MG tablet     lisinopril (ZESTRIL) 40 MG tablet     metoprolol succinate ER (TOPROL XL) 200 MG 24 hr tablet     spironolactone (ALDACTONE)  25 MG tablet     Basic metabolic panel        PLAN:  1.  Spironolactone added to hypertension medications.  Labs next week  2.  Repeat hemoglobin A1c next week.    Follow-up in 1 month for hypertension recheck.           COUNSELING:  Reviewed preventive health counseling, as reflected in patient instructions        He reports that he has never smoked. He has never used smokeless tobacco.      Appropriate preventive services were discussed with this patient, including applicable screening as appropriate for cardiovascular disease, diabetes, osteopenia/osteoporosis, and glaucoma.  As appropriate for age/gender, discussed screening for colorectal cancer, prostate cancer, breast cancer, and cervical cancer. Checklist reviewing preventive services available has been given to the patient.    Reviewed patients plan of care and provided an AVS. The Basic Care Plan (routine screening as documented in Health Maintenance) for Barbie meets the Care Plan requirement. This Care Plan has been established and reviewed with the Patient.          Aric Stephens MD  [facility]    Identified Health Risks:

## 2022-11-21 NOTE — PATIENT INSTRUCTIONS
Patient Education   Personalized Prevention Plan  You are due for the preventive services outlined below.  Your care team is available to assist you in scheduling these services.  If you have already completed any of these items, please share that information with your care team to update in your medical record.  Health Maintenance Due   Topic Date Due     COVID-19 Vaccine (1) Never done     Eye Exam  06/03/2020     Diabetic Foot Exam  06/15/2022     Flu Vaccine (1) 09/01/2022     Pneumococcal Vaccine (2 - PCV) 06/15/2022

## 2022-12-01 ENCOUNTER — LAB (OUTPATIENT)
Dept: LAB | Facility: CLINIC | Age: 67
End: 2022-12-01
Payer: COMMERCIAL

## 2022-12-01 DIAGNOSIS — I10 ESSENTIAL HYPERTENSION WITH GOAL BLOOD PRESSURE LESS THAN 140/90: ICD-10-CM

## 2022-12-01 DIAGNOSIS — E11.9 TYPE 2 DIABETES MELLITUS WITHOUT COMPLICATION, WITHOUT LONG-TERM CURRENT USE OF INSULIN (H): ICD-10-CM

## 2022-12-01 LAB
ANION GAP SERPL CALCULATED.3IONS-SCNC: 5 MMOL/L (ref 3–14)
BUN SERPL-MCNC: 18 MG/DL (ref 7–30)
CALCIUM SERPL-MCNC: 8.8 MG/DL (ref 8.5–10.1)
CHLORIDE BLD-SCNC: 104 MMOL/L (ref 94–109)
CO2 SERPL-SCNC: 27 MMOL/L (ref 20–32)
CREAT SERPL-MCNC: 0.86 MG/DL (ref 0.66–1.25)
GFR SERPL CREATININE-BSD FRML MDRD: >90 ML/MIN/1.73M2
GLUCOSE BLD-MCNC: 138 MG/DL (ref 70–99)
HBA1C MFR BLD: 6.6 % (ref 0–5.6)
POTASSIUM BLD-SCNC: 4.2 MMOL/L (ref 3.4–5.3)
SODIUM SERPL-SCNC: 136 MMOL/L (ref 133–144)

## 2022-12-01 PROCEDURE — 83036 HEMOGLOBIN GLYCOSYLATED A1C: CPT

## 2022-12-01 PROCEDURE — 80048 BASIC METABOLIC PNL TOTAL CA: CPT

## 2022-12-01 PROCEDURE — 36415 COLL VENOUS BLD VENIPUNCTURE: CPT

## 2022-12-01 NOTE — RESULT ENCOUNTER NOTE
"Will have staff call patient regarding normal test results:  \"Your test results fall within the expected range(s) or remain unchanged from previous results.  Please continue with your current treatment plan.\"    Aric Stephens MD "

## 2022-12-19 ENCOUNTER — OFFICE VISIT (OUTPATIENT)
Dept: FAMILY MEDICINE | Facility: CLINIC | Age: 67
End: 2022-12-19
Payer: COMMERCIAL

## 2022-12-19 VITALS
DIASTOLIC BLOOD PRESSURE: 84 MMHG | SYSTOLIC BLOOD PRESSURE: 142 MMHG | TEMPERATURE: 97.7 F | OXYGEN SATURATION: 99 % | HEIGHT: 70 IN | WEIGHT: 249 LBS | BODY MASS INDEX: 35.65 KG/M2 | HEART RATE: 56 BPM

## 2022-12-19 DIAGNOSIS — E11.9 TYPE 2 DIABETES MELLITUS WITHOUT COMPLICATION, WITHOUT LONG-TERM CURRENT USE OF INSULIN (H): ICD-10-CM

## 2022-12-19 DIAGNOSIS — I10 ESSENTIAL HYPERTENSION WITH GOAL BLOOD PRESSURE LESS THAN 140/90: Primary | ICD-10-CM

## 2022-12-19 PROCEDURE — 99214 OFFICE O/P EST MOD 30 MIN: CPT | Performed by: FAMILY MEDICINE

## 2022-12-19 RX ORDER — SPIRONOLACTONE 50 MG/1
50 TABLET, FILM COATED ORAL DAILY
Qty: 90 TABLET | Refills: 1 | Status: SHIPPED | OUTPATIENT
Start: 2022-12-19 | End: 2023-12-05

## 2022-12-19 ASSESSMENT — PAIN SCALES - GENERAL: PAINLEVEL: SEVERE PAIN (6)

## 2022-12-19 NOTE — PROGRESS NOTES
Assessment & Plan     ASSESSMENT/ORDERS:    ICD-10-CM    1. Type 2 diabetes mellitus without complication, without long-term current use of insulin (H)  E11.9       2. Essential hypertension with goal blood pressure less than 140/90  I10 spironolactone (ALDACTONE) 50 MG tablet     Basic metabolic panel        PLAN:  1.  Spironolactone dose increased as blood pressure not at goal.  Labs in 2 weeks.                 Return in about 6 months (around 6/19/2023) for diabetes recheck, hypertension recheck.    Aric Stephens MD  [facility]    Isidoro Valentin is a 67 year old, presenting for the following health issues:  Hypertension      History of Present Illness       Diabetes:   He presents for follow up of diabetes.  He is checking home blood glucose one time daily. He checks blood glucose before meals.  Blood glucose is never over 200 and never under 70. When his blood glucose is low, the patient is asymptomatic for confusion, blurred vision, lethargy and reports not feeling dizzy, shaky, or weak.  He has no concerns regarding his diabetes at this time.  He is not experiencing numbness or burning in feet, excessive thirst, blurry vision, weight changes or redness, sores or blisters on feet. The patient has not had a diabetic eye exam in the last 12 months.         Hypertension: He presents for follow up of hypertension.  He does check blood pressure  regularly outside of the clinic. Outpatient blood pressures have not been over 140/90. He follows a low salt diet.             Tolerating sprinolactone medication fine.   Blood pressure at home range systolic blood pressure 126-140 and diastolic blood pressure 75-85    Lab on 12/01/2022   Component Date Value Ref Range Status     Hemoglobin A1C 12/01/2022 6.6 (H)  0.0 - 5.6 % Final    Normal <5.7%   Prediabetes 5.7-6.4%    Diabetes 6.5% or higher     Note: Adopted from ADA consensus guidelines.     Sodium 12/01/2022 136  133 - 144  "mmol/L Final     Potassium 12/01/2022 4.2  3.4 - 5.3 mmol/L Final     Chloride 12/01/2022 104  94 - 109 mmol/L Final     Carbon Dioxide (CO2) 12/01/2022 27  20 - 32 mmol/L Final     Anion Gap 12/01/2022 5  3 - 14 mmol/L Final     Urea Nitrogen 12/01/2022 18  7 - 30 mg/dL Final     Creatinine 12/01/2022 0.86  0.66 - 1.25 mg/dL Final     Calcium 12/01/2022 8.8  8.5 - 10.1 mg/dL Final     Glucose 12/01/2022 138 (H)  70 - 99 mg/dL Final     GFR Estimate 12/01/2022 >90  >60 mL/min/1.73m2 Final    Effective December 21, 2021 eGFRcr in adults is calculated using the 2021 CKD-EPI creatinine equation which includes age and gender (Ashley yates al., [place], DOI: 10.1056/BPLMto2436878)          Review of Systems         Objective    BP (!) 140/82   Pulse 56   Temp 97.7  F (36.5  C) (Temporal)   Ht 1.784 m (5' 10.25\")   Wt 112.9 kg (249 lb)   SpO2 99%   BMI 35.47 kg/m    Body mass index is 35.47 kg/m .  Physical Exam  Constitutional:       Appearance: He is well-developed and well-nourished.   Cardiovascular:      Rate and Rhythm: Normal rate and regular rhythm.      Heart sounds: Normal heart sounds, S1 normal and S2 normal. No murmur heard.  Pulmonary:      Effort: Pulmonary effort is normal. No respiratory distress.      Breath sounds: Normal breath sounds. No wheezing, rhonchi or rales.   Neurological:      Mental Status: He is alert.                            "

## 2023-01-13 ENCOUNTER — LAB (OUTPATIENT)
Dept: LAB | Facility: CLINIC | Age: 68
End: 2023-01-13
Payer: COMMERCIAL

## 2023-01-13 DIAGNOSIS — I10 ESSENTIAL HYPERTENSION WITH GOAL BLOOD PRESSURE LESS THAN 140/90: ICD-10-CM

## 2023-01-13 LAB
ANION GAP SERPL CALCULATED.3IONS-SCNC: 3 MMOL/L (ref 3–14)
BUN SERPL-MCNC: 16 MG/DL (ref 7–30)
CALCIUM SERPL-MCNC: 9.1 MG/DL (ref 8.5–10.1)
CHLORIDE BLD-SCNC: 107 MMOL/L (ref 94–109)
CO2 SERPL-SCNC: 29 MMOL/L (ref 20–32)
CREAT SERPL-MCNC: 0.8 MG/DL (ref 0.66–1.25)
GFR SERPL CREATININE-BSD FRML MDRD: >90 ML/MIN/1.73M2
GLUCOSE BLD-MCNC: 145 MG/DL (ref 70–99)
POTASSIUM BLD-SCNC: 4.3 MMOL/L (ref 3.4–5.3)
SODIUM SERPL-SCNC: 139 MMOL/L (ref 133–144)

## 2023-01-13 PROCEDURE — 36415 COLL VENOUS BLD VENIPUNCTURE: CPT

## 2023-01-13 PROCEDURE — 80048 BASIC METABOLIC PNL TOTAL CA: CPT

## 2023-01-16 NOTE — RESULT ENCOUNTER NOTE
"Will have staff call patient with message regarding recent results:  \"Test results indicate labs are fine with new medication.\"    Need to know what current blood pressures are.  He should come in for a nurse recheck or just make sure we know his blood pressure is below 140/90.    Aric Stephens MD "

## 2023-01-17 ENCOUNTER — TELEPHONE (OUTPATIENT)
Dept: FAMILY MEDICINE | Facility: CLINIC | Age: 68
End: 2023-01-17
Payer: COMMERCIAL

## 2023-01-17 NOTE — TELEPHONE ENCOUNTER
Spoke with patient and informed of results below. Patient states he has been taking blood pressure readings at home. They have been around 120/75 around there.  Nothing over 140/90 in the last 3 weeks or so.     Liudmila Bond MA

## 2023-01-17 NOTE — TELEPHONE ENCOUNTER
Spoke with patient and informed of note below. Patient understood. Will call back when it gets closer to schedule.     Closing encounter.     Liudmila Bond MA

## 2023-01-17 NOTE — TELEPHONE ENCOUNTER
"----- Message from Aric Stephens MD sent at 1/16/2023  5:22 PM CST -----  Will have staff call patient with message regarding recent results:  \"Test results indicate labs are fine with new medication.\"    Need to know what current blood pressures are.  He should come in for a nurse recheck or just make sure we know his blood pressure is below 140/90.    Aric Stephens MD   "

## 2023-02-13 ENCOUNTER — TELEPHONE (OUTPATIENT)
Dept: SLEEP MEDICINE | Facility: CLINIC | Age: 68
End: 2023-02-13
Payer: COMMERCIAL

## 2023-02-13 DIAGNOSIS — G47.33 OSA (OBSTRUCTIVE SLEEP APNEA): Primary | ICD-10-CM

## 2023-03-03 ENCOUNTER — OFFICE VISIT (OUTPATIENT)
Dept: SLEEP MEDICINE | Facility: CLINIC | Age: 68
End: 2023-03-03
Payer: COMMERCIAL

## 2023-03-03 VITALS
DIASTOLIC BLOOD PRESSURE: 80 MMHG | BODY MASS INDEX: 35.36 KG/M2 | OXYGEN SATURATION: 97 % | SYSTOLIC BLOOD PRESSURE: 138 MMHG | WEIGHT: 247 LBS | HEART RATE: 59 BPM | HEIGHT: 70 IN

## 2023-03-03 DIAGNOSIS — G47.33 OSA (OBSTRUCTIVE SLEEP APNEA): Primary | ICD-10-CM

## 2023-03-03 PROCEDURE — 99213 OFFICE O/P EST LOW 20 MIN: CPT | Performed by: PHYSICIAN ASSISTANT

## 2023-03-03 ASSESSMENT — SLEEP AND FATIGUE QUESTIONNAIRES
HOW LIKELY ARE YOU TO NOD OFF OR FALL ASLEEP WHILE LYING DOWN TO REST IN THE AFTERNOON WHEN CIRCUMSTANCES PERMIT: MODERATE CHANCE OF DOZING
HOW LIKELY ARE YOU TO NOD OFF OR FALL ASLEEP WHILE SITTING QUIETLY AFTER LUNCH WITHOUT ALCOHOL: SLIGHT CHANCE OF DOZING
HOW LIKELY ARE YOU TO NOD OFF OR FALL ASLEEP WHILE SITTING INACTIVE IN A PUBLIC PLACE: WOULD NEVER DOZE
HOW LIKELY ARE YOU TO NOD OFF OR FALL ASLEEP WHEN YOU ARE A PASSENGER IN A CAR FOR AN HOUR WITHOUT A BREAK: WOULD NEVER DOZE
HOW LIKELY ARE YOU TO NOD OFF OR FALL ASLEEP WHILE SITTING AND READING: WOULD NEVER DOZE
HOW LIKELY ARE YOU TO NOD OFF OR FALL ASLEEP IN A CAR, WHILE STOPPED FOR A FEW MINUTES IN TRAFFIC: WOULD NEVER DOZE
HOW LIKELY ARE YOU TO NOD OFF OR FALL ASLEEP WHILE WATCHING TV: SLIGHT CHANCE OF DOZING
HOW LIKELY ARE YOU TO NOD OFF OR FALL ASLEEP WHILE SITTING AND TALKING TO SOMEONE: WOULD NEVER DOZE

## 2023-03-03 NOTE — PROGRESS NOTES
Does Barbie have a CPAP/Bipap?  Yes     Type of mask: Nasal     [facility]: [facility] [phone]    [email or web address]     [place] Sleep Scale:4       Sleep Apnea - Follow-up Visit:    Impression/Plan:    Severe Sleep apnea. He is Tolerating PAP well. Daytime symptoms are stable.   Supplies re ordered    Barbie Portillo will follow up in about 2 year(s).     Fifteen minutes spent with patient, all of which were spent face-to-face counseling, consulting, coordinating plan of care.      CC:  Aric Stephens,         History of Present Illness:  Chief Complaint   Patient presents with     CPAP Follow Up       Barbie Portillo presents for follow-up of their severe sleep apnea, managed with CPAP.     No specialty comments available.    Do you use a CPAP Machine at home: Yes  Overall, on a scale of 0-10 how would you rate your CPAP (0 poor, 10 great): 5    What type of mask do you use: Nasal Mask  Is your mask comfortable: Yes  If not, why:      Is your mask leaking: No  If yes, where do you feel it:    How many night per week does the mask leak (0-7):      Do you notice snoring with mask on: No  Do you notice gasping arousals with mask on: No  Are you having significant oral or nasal dryness: No  Is the pressure setting comfortable: Yes  If not, why:      What is your typical bedtime: 12am  How long does it take you to go to sleep on PAP therapy: 10min  What time do you typically get out of bed for the day: 7am  How many hours on average per night are you using PAP therapy: 6 7 hrs  How many hours are you sleeping per night: 4  7 hrs  Do you feel well rested in the morning: Yes          Respironics     Auto-PAP 6 - 12 cmH2O 30 day usage data:    86.7% of days with > 4 hours of use. 1/30 days with no use.   Average use 6 hours 1 minutes per day.   Average leak 1 LPM.  Average % of night in large leak 0%.    CPAP 90% pressure 8.3cm.   AHI 2 events per hour.          EPWORTH SLEEPINESS SCALE      [place] Sleepiness Scale ( MARTHA [place]  [id number]<br>ESS - [place]/English - Final version - 21 Nov 07 - [facility] [place].) 3/3/2023   Sitting and reading Would never doze   Watching TV Slight chance of dozing   Sitting, inactive in a public place (e.g. a theatre or a meeting) Would never doze   As a passenger in a car for an hour without a break Would never doze   Lying down to rest in the afternoon when circumstances permit Moderate chance of dozing   Sitting and talking to someone Would never doze   Sitting quietly after a lunch without alcohol Slight chance of dozing   In a car, while stopped for a few minutes in traffic Would never doze   [place] Score (MC) 4   [place] Score (Sleep) 4       INSOMNIA SEVERITY INDEX (ARCHANA)      Insomnia Severity Index (ARCHANA) 3/3/2023   Difficulty falling asleep 1   Difficulty staying asleep 0   Problems waking up too early 0   How SATISFIED/DISSATISFIED are you with your CURRENT sleep pattern? 1   How NOTICEABLE to others do you think your sleep problem is in terms of impairing the quality of your life? 0   How WORRIED/DISTRESSED are you about your current sleep problem? 0   To what extent do you consider your sleep problem to INTERFERE with your daily functioning (e.g. daytime fatigue, mood, ability to function at work/daily chores, concentration, memory, mood, etc.) CURRENTLY? 0   ARCHANA Total Score 2       Guidelines for Scoring/Interpretation:  Total score categories:  0-7 = No clinically significant insomnia   8-14 = Subthreshold insomnia   15-21 = Clinical insomnia (moderate severity)  22-28 = Clinical insomnia (severe)  Used via courtesy of www.[place].gov with permission from Yanick Bernal PhD., [facility]      Past medical/surgical history, family history, social history, medications and allergies were reviewed.        Problem List:  Patient Active Problem List    Diagnosis Date Noted     Primary osteoarthritis of left hip  "01/30/2020     Priority: Medium     Severe obesity (BMI 35.0-39.9) with comorbidity (H) 12/18/2018     Priority: Medium     Type 2 diabetes mellitus without complication, without long-term current use of insulin (H) 07/14/2015     Priority: Medium     SEVERE NASRIN (obstructive sleep apnea) 06/04/2013     Priority: Medium     [place]  5/29/2013  Severe AHI 97.5  Oxygen Andrzej 84%  CPAP 8cmH2O       Essential hypertension with goal blood pressure less than 140/90 04/18/2011     Priority: Medium        Pulse 59   Ht 1.784 m (5' 10.25\")   Wt 112 kg (247 lb)   SpO2 97%   BMI 35.19 kg/m      "

## 2023-12-05 ENCOUNTER — OFFICE VISIT (OUTPATIENT)
Dept: FAMILY MEDICINE | Facility: CLINIC | Age: 68
End: 2023-12-05
Payer: COMMERCIAL

## 2023-12-05 VITALS
TEMPERATURE: 97.6 F | HEART RATE: 61 BPM | SYSTOLIC BLOOD PRESSURE: 136 MMHG | WEIGHT: 252 LBS | RESPIRATION RATE: 20 BRPM | HEIGHT: 69 IN | DIASTOLIC BLOOD PRESSURE: 84 MMHG | OXYGEN SATURATION: 96 % | BODY MASS INDEX: 37.33 KG/M2

## 2023-12-05 DIAGNOSIS — Z00.00 ENCOUNTER FOR MEDICARE ANNUAL WELLNESS EXAM: Primary | ICD-10-CM

## 2023-12-05 DIAGNOSIS — H61.92 SKIN LESION OF LEFT EAR: ICD-10-CM

## 2023-12-05 DIAGNOSIS — E66.01 SEVERE OBESITY (BMI 35.0-39.9) WITH COMORBIDITY (H): ICD-10-CM

## 2023-12-05 DIAGNOSIS — E11.9 TYPE 2 DIABETES MELLITUS WITHOUT COMPLICATION, WITHOUT LONG-TERM CURRENT USE OF INSULIN (H): ICD-10-CM

## 2023-12-05 DIAGNOSIS — I10 ESSENTIAL HYPERTENSION WITH GOAL BLOOD PRESSURE LESS THAN 140/90: ICD-10-CM

## 2023-12-05 PROCEDURE — 99207 PR FOOT EXAM NO CHARGE: CPT | Performed by: FAMILY MEDICINE

## 2023-12-05 PROCEDURE — 99214 OFFICE O/P EST MOD 30 MIN: CPT | Mod: 25 | Performed by: FAMILY MEDICINE

## 2023-12-05 PROCEDURE — G0439 PPPS, SUBSEQ VISIT: HCPCS | Performed by: FAMILY MEDICINE

## 2023-12-05 RX ORDER — METOPROLOL SUCCINATE 200 MG/1
200 TABLET, EXTENDED RELEASE ORAL DAILY
Qty: 90 TABLET | Refills: 3 | Status: CANCELLED | OUTPATIENT
Start: 2023-12-05

## 2023-12-05 RX ORDER — HYDROCHLOROTHIAZIDE 25 MG/1
25 TABLET ORAL EVERY MORNING
Qty: 90 TABLET | Refills: 4 | Status: SHIPPED | OUTPATIENT
Start: 2023-12-05

## 2023-12-05 RX ORDER — BLOOD-GLUCOSE METER
EACH MISCELLANEOUS
Qty: 100 EACH | Refills: 3 | Status: CANCELLED | OUTPATIENT
Start: 2023-12-05

## 2023-12-05 RX ORDER — SPIRONOLACTONE 50 MG/1
50 TABLET, FILM COATED ORAL DAILY
Qty: 90 TABLET | Refills: 1 | Status: CANCELLED | OUTPATIENT
Start: 2023-12-05

## 2023-12-05 RX ORDER — RESPIRATORY SYNCYTIAL VIRUS VACCINE 120MCG/0.5
0.5 KIT INTRAMUSCULAR ONCE
Qty: 1 EACH | Refills: 0 | Status: CANCELLED | OUTPATIENT
Start: 2023-12-05 | End: 2023-12-05

## 2023-12-05 RX ORDER — SPIRONOLACTONE 25 MG/1
25 TABLET ORAL DAILY
Qty: 90 TABLET | Refills: 4 | Status: SHIPPED | OUTPATIENT
Start: 2023-12-05

## 2023-12-05 RX ORDER — METOPROLOL SUCCINATE 200 MG/1
200 TABLET, EXTENDED RELEASE ORAL DAILY
Qty: 90 TABLET | Refills: 4 | Status: SHIPPED | OUTPATIENT
Start: 2023-12-05

## 2023-12-05 RX ORDER — LISINOPRIL 40 MG/1
40 TABLET ORAL DAILY
Qty: 90 TABLET | Refills: 3 | Status: CANCELLED | OUTPATIENT
Start: 2023-12-05

## 2023-12-05 RX ORDER — HYDROCHLOROTHIAZIDE 25 MG/1
25 TABLET ORAL EVERY MORNING
Qty: 90 TABLET | Refills: 3 | Status: CANCELLED | OUTPATIENT
Start: 2023-12-05

## 2023-12-05 RX ORDER — LISINOPRIL 40 MG/1
40 TABLET ORAL DAILY
Qty: 90 TABLET | Refills: 4 | Status: SHIPPED | OUTPATIENT
Start: 2023-12-05

## 2023-12-05 ASSESSMENT — ENCOUNTER SYMPTOMS
CONSTIPATION: 0
DIARRHEA: 0
PARESTHESIAS: 0
FREQUENCY: 0
DYSURIA: 0
FEVER: 0
MYALGIAS: 0
NAUSEA: 0
HEADACHES: 0
DIZZINESS: 0
SHORTNESS OF BREATH: 0
CHILLS: 0
EYE PAIN: 0
SORE THROAT: 0
JOINT SWELLING: 1
HEMATURIA: 0
HEMATOCHEZIA: 0
COUGH: 0
WEAKNESS: 0
ARTHRALGIAS: 1
PALPITATIONS: 0
NERVOUS/ANXIOUS: 0
HEARTBURN: 1
ABDOMINAL PAIN: 0

## 2023-12-05 ASSESSMENT — PAIN SCALES - GENERAL: PAINLEVEL: SEVERE PAIN (7)

## 2023-12-05 ASSESSMENT — PATIENT HEALTH QUESTIONNAIRE - PHQ9
SUM OF ALL RESPONSES TO PHQ QUESTIONS 1-9: 0
10. IF YOU CHECKED OFF ANY PROBLEMS, HOW DIFFICULT HAVE THESE PROBLEMS MADE IT FOR YOU TO DO YOUR WORK, TAKE CARE OF THINGS AT HOME, OR GET ALONG WITH OTHER PEOPLE: NOT DIFFICULT AT ALL
SUM OF ALL RESPONSES TO PHQ QUESTIONS 1-9: 0

## 2023-12-05 ASSESSMENT — ACTIVITIES OF DAILY LIVING (ADL): CURRENT_FUNCTION: NO ASSISTANCE NEEDED

## 2023-12-05 NOTE — PATIENT INSTRUCTIONS
"Patient Education   Personalized Prevention Plan  You are due for the preventive services outlined below.  Your care team is available to assist you in scheduling these services.  If you have already completed any of these items, please share that information with your care team to update in your medical record.  Health Maintenance Due   Topic Date Due     COVID-19 Vaccine (1) Never done     RSV VACCINE (Pregnancy & 60+) (1 - 1-dose 60+ series) Never done     Diabetic Foot Exam  06/15/2022     Pneumococcal Vaccine (2 - PCV) 06/15/2022     A1C Lab  06/01/2023     Cholesterol Lab  08/24/2023     Kidney Microalbumin Urine Test  08/24/2023     ANNUAL REVIEW OF HM ORDERS  08/24/2023     Flu Vaccine (1) 09/01/2023     Learning About Being Physically Active  What is physical activity?     Being physically active means doing any kind of activity that gets your body moving.  The types of physical activity that can help you get fit and stay healthy include:  Aerobic or \"cardio\" activities. These make your heart beat faster and make you breathe harder, such as brisk walking, riding a bike, or running. They strengthen your heart and lungs and build up your endurance.  Strength training activities. These make your muscles work against, or \"resist,\" something. Examples include lifting weights or doing push-ups. These activities help tone and strengthen your muscles and bones.  Stretches. These let you move your joints and muscles through their full range of motion. Stretching helps you be more flexible.  Reaching a balance between these three types of physical activity is important because each one contributes to your overall fitness.  What are the benefits of being active?  Being active is one of the best things you can do for your health. It helps you to:  Feel stronger and have more energy to do all the things you like to do.  Focus better at school or work.  Feel, think, and sleep better.  Reach and stay at a healthy " "weight.  Lose fat and build lean muscle.  Lower your risk for serious health problems, including diabetes, heart attack, high blood pressure, and some cancers.  Keep your heart, lungs, bones, muscles, and joints strong and healthy.  How can you make being active part of your life?  Start slowly. Make it your long-term goal to get at least 30 minutes of exercise on most days of the week. Walking is a good choice. You also may want to do other activities, such as running, swimming, cycling, or playing tennis or team sports.  Pick activities that you like--ones that make your heart beat faster, your muscles stronger, and your muscles and joints more flexible. If you find more than one thing you like doing, do them all. You don't have to do the same thing every day.  Get your heart pumping every day. Any activity that makes your heart beat faster and keeps it at that rate for a while counts.  Here are some great ways to get your heart beating faster:  Go for a brisk walk, run, or hike.  Go for a swim or bike ride.  Take an online exercise class or dance.  Play a game of touch football, basketball, or soccer.  Play tennis, pickleball, or racquetball.  Climb stairs.  Even some household chores can be aerobic. Just do them at a faster pace. Raking or mowing the lawn, sweeping the garage, and vacuuming and cleaning your home all can help get your heart rate up.  Strengthen your muscles during the week. You don't have to lift heavy weights or grow big, bulky muscles to get stronger. Doing a few simple activities that make your muscles work against, or \"resist,\" something can help you get stronger. Aim for at least twice a week.  For example, you can:  Do push-ups or sit-ups, which use your own body weight as resistance.  Lift weights or dumbbells or use stretch bands at home or in a gym or community center.  Stretch your muscles often. Stretching will help you as you become more active. It can help you stay flexible and " "loosen tight muscles. It can also help improve your balance and posture and can be a great way to relax.  Be sure to stretch the muscles you'll be using when you work out. It's best to warm your muscles slightly before you stretch them. Walk or do some other light aerobic activity for a few minutes. Then start stretching.  When you stretch your muscles:  Do it slowly. Stretching is not about going fast or making sudden movements.  Don't push or bounce during a stretch.  Hold each stretch for at least 15 to 30 seconds, if you can. You should feel a stretch in the muscle, but not pain.  Breathe out as you do the stretch. Then breathe in as you hold the stretch. Don't hold your breath.  If you're worried about how more activity might affect your health, have a checkup before you start. Follow any special advice your doctor gives you for getting a smart start.  Where can you learn more?  Go to https://www.[employer].net/patiented  Enter W332 in the search box to learn more about \"Learning About Being Physically Active.\"  Current as of: June 6, 2023               Content Version: 13.8    6714-2150 [employer].   Care instructions adapted under license by your healthcare professional. If you have questions about a medical condition or this instruction, always ask your healthcare professional. [employer] disclaims any warranty or liability for your use of this information.         "

## 2023-12-05 NOTE — PROGRESS NOTES
"SUBJECTIVE:   Barbie is a 68 year old, presenting for the following:  Wellness Visit        12/5/2023     1:42 PM   Additional Questions   Roomed by Dali RODRIGUEZ       Are you in the first 12 months of your Medicare coverage?  No    Healthy Habits:     In general, how would you rate your overall health?  Good    Frequency of exercise:  1 day/week    Duration of exercise:  Less than 15 minutes    Do you usually eat at least 4 servings of fruit and vegetables a day, include whole grains    & fiber and avoid regularly eating high fat or \"junk\" foods?  Yes    Taking medications regularly:  Yes    Medication side effects:  Not applicable    Ability to successfully perform activities of daily living:  No assistance needed    Home Safety:  No safety concerns identified    Hearing Impairment:  No hearing concerns    In the past 6 months, have you been bothered by leaking of urine?  No    In general, how would you rate your overall mental or emotional health?  Good    Additional concerns today:  No    Blood pressure at home is around high 130s systolic blood pressure and diastolic blood pressure 80s.  Has been taking 1/2 dose spironolactone due to side effects from medication.      Blood sugars have been in 150s.  Not on any medication.      He has a skin lesion on his left ear present for a few months.  Has not resolved at all, appears to be a pimple and then improves and then festers again.  No ulceration.  Did bleed once and scab.    Today's PHQ-9 Score:       12/5/2023     1:36 PM   PHQ-9 SCORE   PHQ-9 Total Score MyChart 0   PHQ-9 Total Score 0           Have you ever done Advance Care Planning? (For example, a Health Directive, POLST, or a discussion with a medical provider or your loved ones about your wishes): Yes, patient states has an Advance Care Planning document and will bring a copy to the clinic.       Fall risk  Fallen 2 or more times in the past year?: No  Any fall with injury in the past year?: No      Cognitive " Screening   1) Repeat 3 items (Leader, Season, Table)    2) Clock draw: NORMAL  3) 3 item recall: Recalls 2 objects   Results: NORMAL clock, 1-2 items recalled: COGNITIVE IMPAIRMENT LESS LIKELY    Mini-CogTM Copyright PRUDENCE Saavedra. Licensed by the author for use in [facility]; reprinted with permission (dylan@[facility]). All rights reserved.      Do you have sleep apnea, excessive snoring or daytime drowsiness? : yes    Reviewed and updated as needed this visit by clinical staff   Tobacco  Allergies  Meds              Reviewed and updated as needed this visit by Provider                 Social History     Tobacco Use     Smoking status: Never     Smokeless tobacco: Never   Substance Use Topics     Alcohol use: No             12/5/2023     1:33 PM   Alcohol Use   Prescreen: >3 drinks/day or >7 drinks/week? Not Applicable       Do you have a current opioid prescription? No  Do you use any other controlled substances or medications that are not prescribed by a provider? None        Current providers sharing in care for this patient include:     Patient Care Team:  Aric Stephens MD as PCP - General (Family Practice)  Aric Stephens MD as Assigned PCP  Arnav Arce PA-C as Assigned Sleep Provider    The following health maintenance items are reviewed in Epic and correct as of today:  Health Maintenance   Topic Date Due     COVID-19 Vaccine (1) Never done     RSV VACCINE (Pregnancy & 60+) (1 - 1-dose 60+ series) Never done     Pneumococcal Vaccine: 65+ Years (2 - PCV) 06/15/2022     INFLUENZA VACCINE (1) 09/01/2023     EYE EXAM  01/24/2024     A1C  06/08/2024     MEDICARE ANNUAL WELLNESS VISIT  12/05/2024     DIABETIC FOOT EXAM  12/05/2024     ANNUAL REVIEW OF HM ORDERS  12/05/2024     FALL RISK ASSESSMENT  12/05/2024     BMP  12/08/2024     LIPID  12/08/2024     MICROALBUMIN  12/08/2024     DTAP/TDAP/TD IMMUNIZATION (3 - Td or Tdap) 06/28/2028     ADVANCE CARE PLANNING  12/05/2028      "COLORECTAL CANCER SCREENING  01/15/2029     HEPATITIS C SCREENING  Completed     PHQ-2 (once per calendar year)  Completed     ZOSTER IMMUNIZATION  Completed     AORTIC ANEURYSM SCREENING (SYSTEM ASSIGNED)  Completed     IPV IMMUNIZATION  Aged Out     HPV IMMUNIZATION  Aged Out     MENINGITIS IMMUNIZATION  Aged Out     RSV MONOCLONAL ANTIBODY  Aged Out               Review of Systems   Constitutional:  Negative for chills and fever.   HENT:  Negative for congestion, ear pain, hearing loss and sore throat.    Eyes:  Negative for pain and visual disturbance.   Respiratory:  Negative for cough and shortness of breath.    Cardiovascular:  Positive for chest pain. Negative for palpitations and peripheral edema.   Gastrointestinal:  Positive for heartburn. Negative for abdominal pain, constipation, diarrhea, hematochezia and nausea.   Genitourinary:  Negative for dysuria, frequency, genital sores, hematuria, impotence, penile discharge and urgency.   Musculoskeletal:  Positive for arthralgias and joint swelling. Negative for myalgias.   Skin:  Negative for rash.   Neurological:  Negative for dizziness, weakness, headaches and paresthesias.   Psychiatric/Behavioral:  Negative for mood changes. The patient is not nervous/anxious.          OBJECTIVE:   /84   Pulse 61   Temp 97.6  F (36.4  C) (Tympanic)   Resp 20   Ht 1.765 m (5' 9.49\")   Wt 114.3 kg (252 lb)   SpO2 96%   BMI 36.69 kg/m   Estimated body mass index is 36.69 kg/m  as calculated from the following:    Height as of this encounter: 1.765 m (5' 9.49\").    Weight as of this encounter: 114.3 kg (252 lb).  Physical Exam  Constitutional:       General: He is not in acute distress.     Appearance: He is well-developed.   HENT:      Head: Normocephalic and atraumatic.      Right Ear: Hearing, tympanic membrane, ear canal and external ear normal.      Left Ear: Hearing, tympanic membrane, ear canal and external ear normal.      Nose: Nose normal.      " Mouth/Throat:      Mouth: No oral lesions.      Pharynx: Uvula midline. No oropharyngeal exudate.   Eyes:      General: Lids are normal. No scleral icterus.        Right eye: No discharge.         Left eye: No discharge.      Extraocular Movements: Extraocular movements intact.      Conjunctiva/sclera: Conjunctivae normal.      Pupils: Pupils are equal, round, and reactive to light.   Neck:      Thyroid: No thyroid mass or thyromegaly.      Trachea: No tracheal deviation.   Cardiovascular:      Rate and Rhythm: Normal rate and regular rhythm.      Pulses: Normal pulses.      Heart sounds: Normal heart sounds, S1 normal and S2 normal. No murmur heard.     No S3 or S4 sounds.   Pulmonary:      Effort: Pulmonary effort is normal. No respiratory distress.      Breath sounds: Normal breath sounds. No wheezing, rhonchi or rales.   Abdominal:      General: Bowel sounds are normal. There is no distension.      Palpations: Abdomen is soft. There is no mass.      Tenderness: There is no abdominal tenderness. There is no guarding.   Musculoskeletal:         General: No deformity. Normal range of motion.      Cervical back: Normal range of motion and neck supple.      Comments: FEET:  monofilament exam normal bilaterally.  Good hair growth.  Dorsalis pedis pulses 2+ bilaterally.  Feet warm.    Feet:      Right foot:      Skin integrity: No ulcer, blister, skin breakdown or erythema.      Left foot:      Skin integrity: No ulcer, blister, skin breakdown or erythema.   Lymphadenopathy:      Cervical: No cervical adenopathy.      Upper Body:      Right upper body: No supraclavicular adenopathy.      Left upper body: No supraclavicular adenopathy.   Skin:     General: Skin is warm and dry.      Findings: Lesion present. No rash.      Comments: Skin on left ear lobe has 1-2 mm ulceration with some crust, no honeycolored crust noted.   Neurological:      Mental Status: He is alert and oriented to person, place, and time.      Motor:  "No abnormal muscle tone.      Deep Tendon Reflexes: Reflexes are normal and symmetric.   Psychiatric:         Speech: Speech normal.         Behavior: Behavior is cooperative.         Thought Content: Thought content normal.         Judgment: Judgment normal.           Diagnostic Test Results:  Labs reviewed in Epic    ASSESSMENT / PLAN:   ASSESSMENT/ORDERS:    ICD-10-CM    1. Encounter for Medicare annual wellness exam  Z00.00       2. Essential hypertension with goal blood pressure less than 140/90  I10 spironolactone (ALDACTONE) 25 MG tablet     Basic metabolic panel     Albumin Random Urine Quantitative with Creat Ratio     Lipid panel reflex to direct LDL Fasting     metoprolol succinate ER (TOPROL XL) 200 MG 24 hr tablet     lisinopril (ZESTRIL) 40 MG tablet     hydrochlorothiazide (HYDRODIURIL) 25 MG tablet      3. Severe obesity (BMI 35.0-39.9) with comorbidity (H)  E66.01       4. Type 2 diabetes mellitus without complication, without long-term current use of insulin (H)  E11.9 Hemoglobin A1c     FOOT EXAM      5. Skin lesion of left ear  H61.92         PLAN:  1.  Will have him monitor skin lesion of left ear, recommend recheck in 2 months for resolution.  2.   Medications refilled for all other above noted stable conditions.  Labs ordered as noted above.   3.  Discussed impact severe obesity has on hypertension and diabetes.  Importance of weight loss discussed and diet/exercise strategies recommended.           COUNSELING:  Reviewed preventive health counseling, as reflected in patient instructions      BMI:   Estimated body mass index is 36.69 kg/m  as calculated from the following:    Height as of this encounter: 1.765 m (5' 9.49\").    Weight as of this encounter: 114.3 kg (252 lb).   Weight management plan: Discussed healthy diet and exercise guidelines      He reports that he has never smoked. He has never used smokeless tobacco.      Appropriate preventive services were discussed with this patient, " including applicable screening as appropriate for fall prevention, nutrition, physical activity, Tobacco-use cessation, weight loss and cognition.  Checklist reviewing preventive services available has been given to the patient.    Reviewed patients plan of care and provided an AVS. The Basic Care Plan (routine screening as documented in Health Maintenance) for Barbie meets the Care Plan requirement. This Care Plan has been established and reviewed with the Patient.          Aric Stephens MD  [facility]    Identified Health Risks:  I have reviewed Opioid Use Disorder and Substance Use Disorder risk factors and made any needed referrals.   Answers submitted by the patient for this visit:  Patient Health Questionnaire (Submitted on 12/5/2023)  If you checked off any problems, how difficult have these problems made it for you to do your work, take care of things at home, or get along with other people?: Not difficult at all  PHQ9 TOTAL SCORE: 0

## 2023-12-08 ENCOUNTER — LAB (OUTPATIENT)
Dept: LAB | Facility: CLINIC | Age: 68
End: 2023-12-08
Payer: COMMERCIAL

## 2023-12-08 DIAGNOSIS — E11.9 TYPE 2 DIABETES MELLITUS WITHOUT COMPLICATION, WITHOUT LONG-TERM CURRENT USE OF INSULIN (H): ICD-10-CM

## 2023-12-08 DIAGNOSIS — I10 ESSENTIAL HYPERTENSION WITH GOAL BLOOD PRESSURE LESS THAN 140/90: ICD-10-CM

## 2023-12-08 LAB
ANION GAP SERPL CALCULATED.3IONS-SCNC: 9 MMOL/L (ref 7–15)
BUN SERPL-MCNC: 24.3 MG/DL (ref 8–23)
CALCIUM SERPL-MCNC: 9.9 MG/DL (ref 8.8–10.2)
CHLORIDE SERPL-SCNC: 99 MMOL/L (ref 98–107)
CHOLEST SERPL-MCNC: 127 MG/DL
CREAT SERPL-MCNC: 1.23 MG/DL (ref 0.67–1.17)
CREAT UR-MCNC: 128.4 MG/DL
DEPRECATED HCO3 PLAS-SCNC: 26 MMOL/L (ref 22–29)
EGFRCR SERPLBLD CKD-EPI 2021: 64 ML/MIN/1.73M2
FASTING STATUS PATIENT QL REPORTED: YES
GLUCOSE SERPL-MCNC: 162 MG/DL (ref 70–99)
HBA1C MFR BLD: 6.5 %
HDLC SERPL-MCNC: 38 MG/DL
LDLC SERPL CALC-MCNC: 53 MG/DL
MICROALBUMIN UR-MCNC: 14.2 MG/L
MICROALBUMIN/CREAT UR: 11.06 MG/G CR (ref 0–17)
NONHDLC SERPL-MCNC: 89 MG/DL
POTASSIUM SERPL-SCNC: 4.8 MMOL/L (ref 3.4–5.3)
SODIUM SERPL-SCNC: 134 MMOL/L (ref 135–145)
TRIGL SERPL-MCNC: 178 MG/DL

## 2023-12-08 PROCEDURE — 83036 HEMOGLOBIN GLYCOSYLATED A1C: CPT

## 2023-12-08 PROCEDURE — 36415 COLL VENOUS BLD VENIPUNCTURE: CPT

## 2023-12-08 PROCEDURE — 82043 UR ALBUMIN QUANTITATIVE: CPT

## 2023-12-08 PROCEDURE — 80048 BASIC METABOLIC PNL TOTAL CA: CPT

## 2023-12-08 PROCEDURE — 80061 LIPID PANEL: CPT

## 2023-12-08 PROCEDURE — 82570 ASSAY OF URINE CREATININE: CPT

## 2023-12-08 NOTE — LETTER
"December 11, 2023      Barbie Portillo  [address] [facility] [address]        Dear ,    We are writing to inform you of your test results.    \"Test results indicate hemoglobin A1c is at goal, lipid panel looks fine.  Sodium and kidney function are a little lower than expected.  We will recheck this at your next appointment in 2 months.\"       Resulted Orders   Lipid panel reflex to direct LDL Fasting   Result Value Ref Range    Cholesterol 127 <200 mg/dL    Triglycerides 178 (H) <150 mg/dL    Direct Measure HDL 38 (L) >=40 mg/dL    LDL Cholesterol Calculated 53 <=100 mg/dL    Non HDL Cholesterol 89 <130 mg/dL    Patient Fasting > 8hrs? Yes     Narrative    Cholesterol  Desirable:  <200 mg/dL    Triglycerides  Normal:  Less than 150 mg/dL  Borderline High:  150-199 mg/dL  High:  200-499 mg/dL  Very High:  Greater than or equal to 500 mg/dL    Direct Measure HDL  Female:  Greater than or equal to 50 mg/dL   Male:  Greater than or equal to 40 mg/dL    LDL Cholesterol  Desirable:  <100mg/dL  Above Desirable:  100-129 mg/dL   Borderline High:  130-159 mg/dL   High:  160-189 mg/dL   Very High:  >= 190 mg/dL    Non HDL Cholesterol  Desirable:  130 mg/dL  Above Desirable:  130-159 mg/dL  Borderline High:  160-189 mg/dL  High:  190-219 mg/dL  Very High:  Greater than or equal to 220 mg/dL   Hemoglobin A1c   Result Value Ref Range    Hemoglobin A1C 6.5 (H) <5.7 %      Comment:      Normal <5.7%   Prediabetes 5.7-6.4%    Diabetes 6.5% or higher     Note: Adopted from ADA consensus guidelines.   Albumin Random Urine Quantitative with Creat Ratio   Result Value Ref Range    Creatinine Urine mg/dL 128.4 mg/dL      Comment:      The reference ranges have not been established in urine creatinine. The results should be integrated into the clinical context for interpretation.    Albumin Urine mg/L 14.2 mg/L      Comment:      The reference ranges have not been established in urine albumin. The results should be " integrated into the clinical context for interpretation.    Albumin Urine mg/g Cr 11.06 0.00 - 17.00 mg/g Cr      Comment:      Microalbuminuria is defined as an albumin:creatinine ratio of 17 to 299 for males and 25 to 299 for females. A ratio of albumin:creatinine of 300 or higher is indicative of overt proteinuria.  Due to biologic variability, positive results should be confirmed by a second, first-morning random or 24-hour timed urine specimen. If there is discrepancy, a third specimen is recommended. When 2 out of 3 results are in the microalbuminuria range, this is evidence for incipient nephropathy and warrants increased efforts at glucose control, blood pressure control, and institution of therapy with an angiotensin-converting-enzyme (ACE) inhibitor (if the patient can tolerate it).     Basic metabolic panel   Result Value Ref Range    Sodium 134 (L) 135 - 145 mmol/L      Comment:      Reference intervals for this test were updated on 09/26/2023 to more accurately reflect our healthy population. There may be differences in the flagging of prior results with similar values performed with this method. Interpretation of those prior results can be made in the context of the updated reference intervals.     Potassium 4.8 3.4 - 5.3 mmol/L    Chloride 99 98 - 107 mmol/L    Carbon Dioxide (CO2) 26 22 - 29 mmol/L    Anion Gap 9 7 - 15 mmol/L    Urea Nitrogen 24.3 (H) 8.0 - 23.0 mg/dL    Creatinine 1.23 (H) 0.67 - 1.17 mg/dL    GFR Estimate 64 >60 mL/min/1.73m2    Calcium 9.9 8.8 - 10.2 mg/dL    Glucose 162 (H) 70 - 99 mg/dL       If you have any questions or concerns, please call the clinic at the number listed above.       Thank You,  [facility] Team  [phone]

## 2023-12-10 DIAGNOSIS — I10 ESSENTIAL HYPERTENSION WITH GOAL BLOOD PRESSURE LESS THAN 140/90: Primary | ICD-10-CM

## 2023-12-11 NOTE — RESULT ENCOUNTER NOTE
"Will have staff send letter to patient with message regarding recent results:  \"Test results indicate hemoglobin A1c is at goal, lipid panel looks fine.  Sodium and kidney function are a little lower than expected.  We will recheck this at your next appointment in 2 months.\"    Aric Stephens MD "

## 2023-12-19 DIAGNOSIS — E11.9 TYPE 2 DIABETES MELLITUS WITHOUT COMPLICATION, WITHOUT LONG-TERM CURRENT USE OF INSULIN (H): ICD-10-CM

## 2023-12-19 RX ORDER — BLOOD SUGAR DIAGNOSTIC
STRIP MISCELLANEOUS
Qty: 100 STRIP | Refills: 3 | Status: SHIPPED | OUTPATIENT
Start: 2023-12-19

## 2023-12-19 RX ORDER — LANCETS 33 GAUGE
EACH MISCELLANEOUS
Qty: 100 EACH | Refills: 3 | Status: SHIPPED | OUTPATIENT
Start: 2023-12-19

## 2024-02-05 ENCOUNTER — TELEPHONE (OUTPATIENT)
Dept: FAMILY MEDICINE | Facility: CLINIC | Age: 69
End: 2024-02-05
Payer: COMMERCIAL

## 2024-02-12 ENCOUNTER — OFFICE VISIT (OUTPATIENT)
Dept: FAMILY MEDICINE | Facility: CLINIC | Age: 69
End: 2024-02-12
Payer: COMMERCIAL

## 2024-02-12 VITALS
OXYGEN SATURATION: 96 % | BODY MASS INDEX: 37.29 KG/M2 | WEIGHT: 256.1 LBS | SYSTOLIC BLOOD PRESSURE: 148 MMHG | RESPIRATION RATE: 12 BRPM | DIASTOLIC BLOOD PRESSURE: 84 MMHG | HEART RATE: 60 BPM

## 2024-02-12 DIAGNOSIS — E66.01 SEVERE OBESITY (BMI 35.0-39.9) WITH COMORBIDITY (H): ICD-10-CM

## 2024-02-12 DIAGNOSIS — I10 ESSENTIAL HYPERTENSION WITH GOAL BLOOD PRESSURE LESS THAN 140/90: ICD-10-CM

## 2024-02-12 DIAGNOSIS — H61.92 EARLOBE LESION, LEFT: Primary | ICD-10-CM

## 2024-02-12 DIAGNOSIS — E11.9 TYPE 2 DIABETES MELLITUS WITHOUT COMPLICATION, WITHOUT LONG-TERM CURRENT USE OF INSULIN (H): ICD-10-CM

## 2024-02-12 LAB
ANION GAP SERPL CALCULATED.3IONS-SCNC: 14 MMOL/L (ref 7–15)
BUN SERPL-MCNC: 15.2 MG/DL (ref 8–23)
CALCIUM SERPL-MCNC: 9.5 MG/DL (ref 8.8–10.2)
CHLORIDE SERPL-SCNC: 100 MMOL/L (ref 98–107)
CREAT SERPL-MCNC: 1.04 MG/DL (ref 0.67–1.17)
DEPRECATED HCO3 PLAS-SCNC: 23 MMOL/L (ref 22–29)
EGFRCR SERPLBLD CKD-EPI 2021: 78 ML/MIN/1.73M2
GLUCOSE SERPL-MCNC: 209 MG/DL (ref 70–99)
POTASSIUM SERPL-SCNC: 4.3 MMOL/L (ref 3.4–5.3)
SODIUM SERPL-SCNC: 137 MMOL/L (ref 135–145)

## 2024-02-12 PROCEDURE — 99213 OFFICE O/P EST LOW 20 MIN: CPT | Performed by: FAMILY MEDICINE

## 2024-02-12 PROCEDURE — 36415 COLL VENOUS BLD VENIPUNCTURE: CPT | Performed by: FAMILY MEDICINE

## 2024-02-12 PROCEDURE — 80048 BASIC METABOLIC PNL TOTAL CA: CPT | Performed by: FAMILY MEDICINE

## 2024-02-12 RX ORDER — MUPIROCIN 20 MG/G
OINTMENT TOPICAL 3 TIMES DAILY
Qty: 22 G | Refills: 1 | Status: SHIPPED | OUTPATIENT
Start: 2024-02-12

## 2024-02-12 RX ORDER — RESPIRATORY SYNCYTIAL VIRUS VACCINE 120MCG/0.5
0.5 KIT INTRAMUSCULAR ONCE
Qty: 1 EACH | Refills: 0 | Status: CANCELLED | OUTPATIENT
Start: 2024-02-12 | End: 2024-02-12

## 2024-02-12 RX ORDER — TRIAMCINOLONE ACETONIDE 1 MG/G
OINTMENT TOPICAL 2 TIMES DAILY PRN
Qty: 15 G | Refills: 1 | Status: SHIPPED | OUTPATIENT
Start: 2024-02-12

## 2024-02-12 NOTE — LETTER
March 4, 2024      Barbie Portillo  [address] [facility] [address]        Dear ,    We are writing to inform you of your test results.    Test results indicate that all of your results are normal except for your elevated blood sugar (which would be expected given you have diabetes)     Resulted Orders   Basic metabolic panel  (Ca, Cl, CO2, Creat, Gluc, K, Na, BUN)   Result Value Ref Range    Sodium 137 135 - 145 mmol/L      Comment:      Reference intervals for this test were updated on 09/26/2023 to more accurately reflect our healthy population. There may be differences in the flagging of prior results with similar values performed with this method. Interpretation of those prior results can be made in the context of the updated reference intervals.     Potassium 4.3 3.4 - 5.3 mmol/L    Chloride 100 98 - 107 mmol/L    Carbon Dioxide (CO2) 23 22 - 29 mmol/L    Anion Gap 14 7 - 15 mmol/L    Urea Nitrogen 15.2 8.0 - 23.0 mg/dL    Creatinine 1.04 0.67 - 1.17 mg/dL    GFR Estimate 78 >60 mL/min/1.73m2    Calcium 9.5 8.8 - 10.2 mg/dL    Glucose 209 (H) 70 - 99 mg/dL       If you have any questions or concerns, please call the clinic at the number listed above.       Sincerely,      Aric Stephens MD

## 2024-02-12 NOTE — PROGRESS NOTES
Assessment & Plan     ASSESSMENT/ORDERS:    ICD-10-CM    1. Earlobe lesion, left  H61.92 mupirocin (BACTROBAN) 2 % external ointment     triamcinolone (KENALOG) 0.1 % external ointment      2. Severe obesity (BMI 35.0-39.9) with comorbidity (H)  E66.01       3. Essential hypertension with goal blood pressure less than 140/90  I10 Basic metabolic panel  (Ca, Cl, CO2, Creat, Gluc, K, Na, BUN)      4. Type 2 diabetes mellitus without complication, without long-term current use of insulin (H)  E11.9         PLAN:  Since the earlobe lesion is improving, but has not completely resolved, we will try a combination of Bactroban and triamcinolone ointment to see if this will fully resolve it.  It could be either a dermatitis or minor impetigo.  Patient is due for metabolic panel today for his meals and blood pressure.  Patient come back in 2 weeks to have his blood pressure rechecked with a nurse.  He will bring his blood pressure cuff with him.  Discussed impact severe obesity has on diabetes and hypertension management.  Importance of weight loss discussed and diet/exercise strategies recommended.                   Subjective   Barbie is a 68 year old, presenting for the following health issues:  Derm Problem        2/12/2024    11:01 AM   Additional Questions   Roomed by Ifeoma caldera     History of Present Illness       Reason for visit:  Ear    He eats 2-3 servings of fruits and vegetables daily.He consumes 0 sweetened beverage(s) daily.He exercises with enough effort to increase his heart rate 9 or less minutes per day.  He exercises with enough effort to increase his heart rate 3 or less days per week.   He is taking medications regularly.             Recheck earlier today.  Patient was last seen 2 months ago and the lesion was worse at that time.  Patient notes that it has slowly progressively improved and he has been putting antibiotic ointment on it.  He thinks that this may have been helpful.    Patient is a history of  hypertension diabetes.  His blood pressure is not at goal today.        Objective    BP (!) 144/84   Pulse 60   Resp 12   Wt 116.2 kg (256 lb 1.6 oz)   SpO2 96%   BMI 37.29 kg/m    Body mass index is 37.29 kg/m .  Physical Exam  Constitutional:       Appearance: Normal appearance. He is well-developed.   Cardiovascular:      Rate and Rhythm: Normal rate and regular rhythm.      Heart sounds: Normal heart sounds, S1 normal and S2 normal. No murmur heard.  Pulmonary:      Effort: Pulmonary effort is normal. No respiratory distress.      Breath sounds: Normal breath sounds. No wheezing, rhonchi or rales.   Skin:     Comments: Skin on left ear lobe has 1-2 mm healing lesion with some small amount of honeycolored crust noted.   Neurological:      Mental Status: He is alert.                    Signed Electronically by: Aric Stephens MD

## 2024-02-26 ENCOUNTER — ALLIED HEALTH/NURSE VISIT (OUTPATIENT)
Dept: FAMILY MEDICINE | Facility: CLINIC | Age: 69
End: 2024-02-26
Payer: COMMERCIAL

## 2024-02-26 VITALS — HEART RATE: 62 BPM | DIASTOLIC BLOOD PRESSURE: 82 MMHG | SYSTOLIC BLOOD PRESSURE: 128 MMHG

## 2024-02-26 DIAGNOSIS — I10 ESSENTIAL HYPERTENSION WITH GOAL BLOOD PRESSURE LESS THAN 140/90: Primary | ICD-10-CM

## 2024-02-26 PROCEDURE — 99207 PR NO CHARGE NURSE ONLY: CPT

## 2024-02-26 NOTE — PROGRESS NOTES
Barbie Portillo is a 68 year old patient who comes in today for a Blood Pressure check.  Initial BP:  /82   Pulse 62      62  Disposition: follow-up as previously indicated by provider    Patient brought in machine to compare..     Machine 148/89   Me 128/82    Machine 124/85  Me 128/82

## 2024-03-03 NOTE — RESULT ENCOUNTER NOTE
"Will have staff send letter to patient with message regarding recent results:  \"Test results indicate that all of your results are normal except for your elevated blood sugar (which would be expected given you have diabetes)\"    Aric Stephens MD "

## 2024-12-10 ENCOUNTER — OFFICE VISIT (OUTPATIENT)
Dept: FAMILY MEDICINE | Facility: CLINIC | Age: 69
End: 2024-12-10
Payer: COMMERCIAL

## 2024-12-10 VITALS
RESPIRATION RATE: 18 BRPM | HEART RATE: 56 BPM | BODY MASS INDEX: 37.47 KG/M2 | WEIGHT: 253 LBS | HEIGHT: 69 IN | DIASTOLIC BLOOD PRESSURE: 78 MMHG | SYSTOLIC BLOOD PRESSURE: 136 MMHG | OXYGEN SATURATION: 98 % | TEMPERATURE: 97.8 F

## 2024-12-10 DIAGNOSIS — R80.9 TYPE 2 DIABETES MELLITUS WITH ALBUMINURIA (H): ICD-10-CM

## 2024-12-10 DIAGNOSIS — E11.29 TYPE 2 DIABETES MELLITUS WITH ALBUMINURIA (H): ICD-10-CM

## 2024-12-10 DIAGNOSIS — E11.9 TYPE 2 DIABETES MELLITUS WITHOUT COMPLICATION, WITHOUT LONG-TERM CURRENT USE OF INSULIN (H): ICD-10-CM

## 2024-12-10 DIAGNOSIS — Z28.82 VACCINATION DECLINED BY PARENT: ICD-10-CM

## 2024-12-10 DIAGNOSIS — I10 ESSENTIAL HYPERTENSION WITH GOAL BLOOD PRESSURE LESS THAN 140/90: ICD-10-CM

## 2024-12-10 DIAGNOSIS — M19.072 PRIMARY OSTEOARTHRITIS OF LEFT ANKLE: ICD-10-CM

## 2024-12-10 DIAGNOSIS — Z00.00 ENCOUNTER FOR MEDICARE ANNUAL WELLNESS EXAM: Primary | ICD-10-CM

## 2024-12-10 LAB
ANION GAP SERPL CALCULATED.3IONS-SCNC: 12 MMOL/L (ref 7–15)
BUN SERPL-MCNC: 21.9 MG/DL (ref 8–23)
CALCIUM SERPL-MCNC: 9.7 MG/DL (ref 8.8–10.4)
CHLORIDE SERPL-SCNC: 101 MMOL/L (ref 98–107)
CHOLEST SERPL-MCNC: 133 MG/DL
CREAT SERPL-MCNC: 1.16 MG/DL (ref 0.67–1.17)
CREAT UR-MCNC: 156.4 MG/DL
EGFRCR SERPLBLD CKD-EPI 2021: 68 ML/MIN/1.73M2
EST. AVERAGE GLUCOSE BLD GHB EST-MCNC: 157 MG/DL
FASTING STATUS PATIENT QL REPORTED: YES
FASTING STATUS PATIENT QL REPORTED: YES
GLUCOSE SERPL-MCNC: 165 MG/DL (ref 70–99)
HBA1C MFR BLD: 7.1 %
HCO3 SERPL-SCNC: 23 MMOL/L (ref 22–29)
HDLC SERPL-MCNC: 36 MG/DL
LDLC SERPL CALC-MCNC: 57 MG/DL
MICROALBUMIN UR-MCNC: 35.6 MG/L
MICROALBUMIN/CREAT UR: 22.76 MG/G CR (ref 0–17)
NONHDLC SERPL-MCNC: 97 MG/DL
POTASSIUM SERPL-SCNC: 4.3 MMOL/L (ref 3.4–5.3)
SODIUM SERPL-SCNC: 136 MMOL/L (ref 135–145)
TRIGL SERPL-MCNC: 201 MG/DL

## 2024-12-10 PROCEDURE — 83036 HEMOGLOBIN GLYCOSYLATED A1C: CPT | Performed by: FAMILY MEDICINE

## 2024-12-10 PROCEDURE — 80048 BASIC METABOLIC PNL TOTAL CA: CPT | Performed by: FAMILY MEDICINE

## 2024-12-10 PROCEDURE — 82043 UR ALBUMIN QUANTITATIVE: CPT | Performed by: FAMILY MEDICINE

## 2024-12-10 PROCEDURE — 36415 COLL VENOUS BLD VENIPUNCTURE: CPT | Performed by: FAMILY MEDICINE

## 2024-12-10 PROCEDURE — 99207 PR FOOT EXAM NO CHARGE: CPT | Performed by: FAMILY MEDICINE

## 2024-12-10 PROCEDURE — 99214 OFFICE O/P EST MOD 30 MIN: CPT | Mod: 25 | Performed by: FAMILY MEDICINE

## 2024-12-10 PROCEDURE — 80061 LIPID PANEL: CPT | Performed by: FAMILY MEDICINE

## 2024-12-10 PROCEDURE — G0439 PPPS, SUBSEQ VISIT: HCPCS | Performed by: FAMILY MEDICINE

## 2024-12-10 PROCEDURE — 82570 ASSAY OF URINE CREATININE: CPT | Performed by: FAMILY MEDICINE

## 2024-12-10 RX ORDER — HYDROCHLOROTHIAZIDE 25 MG/1
25 TABLET ORAL EVERY MORNING
Qty: 90 TABLET | Refills: 4 | Status: SHIPPED | OUTPATIENT
Start: 2024-12-10

## 2024-12-10 RX ORDER — LISINOPRIL 40 MG/1
40 TABLET ORAL DAILY
Qty: 90 TABLET | Refills: 4 | Status: SHIPPED | OUTPATIENT
Start: 2024-12-10

## 2024-12-10 RX ORDER — SPIRONOLACTONE 25 MG/1
12.5 TABLET ORAL DAILY
Qty: 45 TABLET | Refills: 4 | Status: SHIPPED | OUTPATIENT
Start: 2024-12-10

## 2024-12-10 RX ORDER — METOPROLOL SUCCINATE 200 MG/1
200 TABLET, EXTENDED RELEASE ORAL DAILY
Qty: 90 TABLET | Refills: 4 | Status: SHIPPED | OUTPATIENT
Start: 2024-12-10

## 2024-12-10 SDOH — HEALTH STABILITY: PHYSICAL HEALTH: ON AVERAGE, HOW MANY DAYS PER WEEK DO YOU ENGAGE IN MODERATE TO STRENUOUS EXERCISE (LIKE A BRISK WALK)?: 3 DAYS

## 2024-12-10 SDOH — HEALTH STABILITY: PHYSICAL HEALTH: ON AVERAGE, HOW MANY MINUTES DO YOU ENGAGE IN EXERCISE AT THIS LEVEL?: 20 MIN

## 2024-12-10 ASSESSMENT — SOCIAL DETERMINANTS OF HEALTH (SDOH): HOW OFTEN DO YOU GET TOGETHER WITH FRIENDS OR RELATIVES?: ONCE A WEEK

## 2024-12-10 ASSESSMENT — PAIN SCALES - GENERAL: PAINLEVEL_OUTOF10: SEVERE PAIN (6)

## 2024-12-10 NOTE — PROGRESS NOTES
Preventive Care Visit  Formerly [facility]  Aric Stephens MD, Family Medicine  Dec 10, 2024      Assessment & Plan     ASSESSMENT/ORDERS:    ICD-10-CM    1. Encounter for Medicare annual wellness exam  Z00.00       2. Type 2 diabetes mellitus without complication, without long-term current use of insulin (H)  E11.9 HEMOGLOBIN A1C     Lipid panel reflex to direct LDL Non-fasting     Albumin Random Urine Quantitative with Creat Ratio     HEMOGLOBIN A1C     Lipid panel reflex to direct LDL Non-fasting     Albumin Random Urine Quantitative with Creat Ratio     OFFICE/OUTPT VISIT,EST,LEVL IV     FOOT EXAM     blood glucose (NO BRAND SPECIFIED) test strip     blood glucose (NO BRAND SPECIFIED) lancets standard     blood glucose monitoring (NO BRAND SPECIFIED) meter device kit      3. Essential hypertension with goal blood pressure less than 140/90  I10 BASIC METABOLIC PANEL     BASIC METABOLIC PANEL     OFFICE/OUTPT VISIT,EST,LEVL IV     spironolactone (ALDACTONE) 25 MG tablet     hydrochlorothiazide (HYDRODIURIL) 25 MG tablet     lisinopril (ZESTRIL) 40 MG tablet     metoprolol succinate ER (TOPROL XL) 200 MG 24 hr tablet      4. Type 2 diabetes mellitus with albuminuria (H)  E11.29 OFFICE/OUTPT VISIT,EST,LEVL IV    R80.9 FOOT EXAM      5. Primary osteoarthritis of left ankle  M19.072 OFFICE/OUTPT VISIT,EST,LEVL IV     Orthopedic  Referral      6. Vaccination declined by parent  Z28.82         PLAN:  Diabetes management reviewed.  Noted his A1c has increased but likely secondary to his decreased exercise.  No medication changes today, but will follow-up in 6 months with repeat A1c and adjust as needed.  If we need to add any medication, would first recommend a SGLT2 inhibitor given his albuminuria today.  For his left ankle osteoarthritis, I recommended that he see Dr. Ignacio Alcantara, our surgical podiatrist here for a second opinion on surgical versus conservative treatment options.   "We did discuss that he may be recommended for physical therapy as a pain reduction strategy so that he can increase his exercise.  We discussed his vaccine declination secondary to his Religion practice that he has changed in the last couple years.  Medications refilled for all other above noted stable conditions.  Labs reviewed as noted below.    Follow-up Visit   Expected date:  Sonu 10, 2025 (Approximate)      Follow Up Appointment Details:     Follow-up with whom?: Me    Follow-Up for what?: Chronic Disease f/u    Chronic Disease f/u: Diabetes    How?: In Person             Follow-up Visit   Expected date:  Dec 17, 2025 (Approximate)      Follow Up Appointment Details:     Follow-up with whom?: PCP    Follow-Up for what?: Medicare Wellness    Welcome or Annual?: Annual Wellness    How?: In Person                          BMI  Estimated body mass index is 37.47 kg/m  as calculated from the following:    Height as of this encounter: 1.75 m (5' 8.9\").    Weight as of this encounter: 114.8 kg (253 lb).       Counseling  Appropriate preventive services were addressed with this patient via screening, questionnaire, or discussion as appropriate for fall prevention, nutrition, physical activity, Tobacco-use cessation, social engagement, weight loss and cognition.  Checklist reviewing preventive services available has been given to the patient.  Reviewed patient's diet, addressing concerns and/or questions.   He is at risk for lack of exercise and has been provided with information to increase physical activity for the benefit of his well-being.   Patient reported safety concerns were addressed today.        Isidoro Valentin is a 69 year old, presenting for the following:  Wellness Visit        12/10/2024     9:37 AM   Additional Questions   Roomed by Alanna SEGOVIA  history of left ankle surgery for OA in 2019, sx worsening and wants another opinion on options.  The surgeon that did his surgery back in 2019 " had retired at the time 2 years ago when he had an issue where he slipped on the rough and reinjured his ankle.  The surgeon that took over for the retired surgeon at the practice he went back to stated that they do not do the type of surgery that was done by the retired podiatrist and tried a couple cortisone injections that were not helpful    Patient notes that his diabetic blood sugars have increased secondary to his inability to use his exercise bike because of his worsening ankle pain.    Hypertension stable, decreased spironolctone on his own to 1/2 tablet daily.        Health Care Directive  Patient does not have a Health Care Directive: Patient states has Advance Directive and will bring in a copy to clinic.      12/10/2024   General Health   How would you rate your overall physical health? (!) FAIR   Feel stress (tense, anxious, or unable to sleep) Not at all            12/10/2024   Nutrition   Diet: Low salt            12/10/2024   Exercise   Days per week of moderate/strenous exercise 3 days   Average minutes spent exercising at this level 20 min            12/10/2024   Social Factors   Frequency of gathering with friends or relatives Once a week   Worry food won't last until get money to buy more No   Food not last or not have enough money for food? No   Do you have housing? (Housing is defined as stable permanent housing and does not include staying ouside in a car, in a tent, in an abandoned building, in an overnight shelter, or couch-surfing.) Yes   Are you worried about losing your housing? No   Lack of transportation? No   Unable to get utilities (heat,electricity)? No            12/10/2024   Fall Risk   Fallen 2 or more times in the past year? No    Trouble with walking or balance? No        Patient-reported          12/10/2024   Activities of Daily Living- Home Safety   Needs help with the following daily activites None of the above   Safety concerns in the home Throw rugs in the hallway             12/10/2024   Dental   Dentist two times every year? Yes            12/10/2024   Hearing Screening   Hearing concerns? None of the above            12/10/2024   Driving Risk Screening   Patient/family members have concerns about driving No            12/10/2024   General Alertness/Fatigue Screening   Have you been more tired than usual lately? No            12/10/2024   Urinary Incontinence Screening   Bothered by leaking urine in past 6 months No            12/10/2024   TB Screening   Were you born outside of the [place]? No            Today's PHQ-2 Score:       12/10/2024     9:33 AM   PHQ-2 ( 1999 [employer])   Q1: Little interest or pleasure in doing things 0    Q2: Feeling down, depressed or hopeless 0    PHQ-2 Score 0    Q1: Little interest or pleasure in doing things Not at all   Q2: Feeling down, depressed or hopeless Not at all   PHQ-2 Score 0       Patient-reported           12/10/2024   Substance Use   Alcohol more than 3/day or more than 7/wk No   Do you have a current opioid prescription? No   How severe/bad is pain from 1 to 10? 6/10   Do you use any other substances recreationally? No        Social History     Tobacco Use    Smoking status: Never    Smokeless tobacco: Never   Vaping Use    Vaping status: Never Used   Substance Use Topics    Alcohol use: No    Drug use: No           12/10/2024   AAA Screening   Family history of Abdominal Aortic Aneurysm (AAA)? No      Last PSA:   PSA   Date Value Ref Range Status   06/30/2018 0.76 0 - 4 ug/L Final     Comment:     Assay Method:  Chemiluminescence using Siemens Vista analyzer     ASCVD Risk   The 10-year ASCVD risk score (Barbara ARMSTRONG, et al., 2019) is: 35.4%    Values used to calculate the score:      Age: 69 years      Sex: Male      Is Non- : No      Diabetic: Yes      Tobacco smoker: No      Systolic Blood Pressure: 136 mmHg      Is BP treated: Yes      HDL Cholesterol: 36 mg/dL      Total Cholesterol: 133  "mg/dL            Reviewed and updated as needed this visit by Provider   Tobacco  Allergies  Meds  Problems  Med Hx  Surg Hx  Fam Hx     Sexual Activity            Current providers sharing in care for this patient include:  Patient Care Team:  Aric Stephens MD as PCP - General (Family Practice)  Aric Stephens MD as Assigned PCP    The following health maintenance items are reviewed in Epic and correct as of today:  Health Maintenance   Topic Date Due    RSV VACCINE (1 - Risk 60-74 years 1-dose series) Never done    Pneumococcal Vaccine: 65+ Years (2 of 2 - PCV) 06/15/2022    INFLUENZA VACCINE (1) 09/01/2024    COVID-19 Vaccine (1 - 2024-25 season) Never done    A1C  06/10/2025    EYE EXAM  08/01/2025    MEDICARE ANNUAL WELLNESS VISIT  12/10/2025    BMP  12/10/2025    LIPID  12/10/2025    MICROALBUMIN  12/10/2025    DIABETIC FOOT EXAM  12/10/2025    ANNUAL REVIEW OF HM ORDERS  12/10/2025    FALL RISK ASSESSMENT  12/10/2025    DTAP/TDAP/TD IMMUNIZATION (3 - Td or Tdap) 06/28/2028    COLORECTAL CANCER SCREENING  01/15/2029    ADVANCE CARE PLANNING  12/10/2029    HEPATITIS C SCREENING  Completed    PHQ-2 (once per calendar year)  Completed    ZOSTER IMMUNIZATION  Completed    HPV IMMUNIZATION  Aged Out    MENINGITIS IMMUNIZATION  Aged Out    RSV MONOCLONAL ANTIBODY  Aged Out            Objective    Exam  /78   Pulse 56   Temp 97.8  F (36.6  C) (Temporal)   Resp 18   Ht 1.75 m (5' 8.9\")   Wt 114.8 kg (253 lb)   SpO2 98%   BMI 37.47 kg/m     Estimated body mass index is 37.47 kg/m  as calculated from the following:    Height as of this encounter: 1.75 m (5' 8.9\").    Weight as of this encounter: 114.8 kg (253 lb).    Physical Exam  Constitutional:       Appearance: He is well-developed.   Cardiovascular:      Rate and Rhythm: Normal rate and regular rhythm.      Heart sounds: Normal heart sounds, S1 normal and S2 normal. No murmur heard.  Pulmonary:      Effort: Pulmonary effort is " normal. No respiratory distress.      Breath sounds: Normal breath sounds. No wheezing, rhonchi or rales.   Musculoskeletal:      Comments: FEET:  monofilament exam normal bilaterally.  Good hair growth.  Dorsalis pedis pulses 2+ bilaterally.  Feet warm.    Feet:      Right foot:      Skin integrity: No ulcer, blister, skin breakdown or erythema.      Left foot:      Skin integrity: No ulcer, blister, skin breakdown or erythema.   Neurological:      Mental Status: He is alert.   Psychiatric:         Behavior: Behavior is cooperative.       Results for orders placed or performed in visit on 12/10/24   HEMOGLOBIN A1C     Status: Abnormal   Result Value Ref Range    Estimated Average Glucose 157 (H) <117 mg/dL    Hemoglobin A1C 7.1 (H) <5.7 %   Lipid panel reflex to direct LDL Non-fasting     Status: Abnormal   Result Value Ref Range    Cholesterol 133 <200 mg/dL    Triglycerides 201 (H) <150 mg/dL    Direct Measure HDL 36 (L) >=40 mg/dL    LDL Cholesterol Calculated 57 <100 mg/dL    Non HDL Cholesterol 97 <130 mg/dL    Patient Fasting > 8hrs? Yes     Narrative    Cholesterol  Desirable: < 200 mg/dL  Borderline High: 200 - 239 mg/dL  High: >= 240 mg/dL    Triglycerides  Normal: < 150 mg/dL  Borderline High: 150 - 199 mg/dL  High: 200-499 mg/dL  Very High: >= 500 mg/dL    Direct Measure HDL  Female: >= 50 mg/dL   Male: >= 40 mg/dL    LDL Cholesterol  Desirable: < 100 mg/dL  Above Desirable: 100 - 129 mg/dL   Borderline High: 130 - 159 mg/dL   High:  160 - 189 mg/dL   Very High: >= 190 mg/dL    Non HDL Cholesterol  Desirable: < 130 mg/dL  Above Desirable: 130 - 159 mg/dL  Borderline High: 160 - 189 mg/dL  High: 190 - 219 mg/dL  Very High: >= 220 mg/dL   Albumin Random Urine Quantitative with Creat Ratio     Status: Abnormal   Result Value Ref Range    Creatinine Urine mg/dL 156.4 mg/dL    Albumin Urine mg/L 35.6 mg/L    Albumin Urine mg/g Cr 22.76 (H) 0.00 - 17.00 mg/g Cr   BASIC METABOLIC PANEL     Status: Abnormal    Result Value Ref Range    Sodium 136 135 - 145 mmol/L    Potassium 4.3 3.4 - 5.3 mmol/L    Chloride 101 98 - 107 mmol/L    Carbon Dioxide (CO2) 23 22 - 29 mmol/L    Anion Gap 12 7 - 15 mmol/L    Urea Nitrogen 21.9 8.0 - 23.0 mg/dL    Creatinine 1.16 0.67 - 1.17 mg/dL    GFR Estimate 68 >60 mL/min/1.73m2    Calcium 9.7 8.8 - 10.4 mg/dL    Glucose 165 (H) 70 - 99 mg/dL    Patient Fasting > 8hrs? Yes                12/10/2024   Mini Cog   Clock Draw Score 2 Normal   3 Item Recall 3 objects recalled   Mini Cog Total Score 5                12/10/2024   Vision Screen   Reason Vision Screen Not Completed --    --   Patient wears corrective lenses (select all that apply) Wears regularly    Wears regularly       Multiple values from one day are sorted in reverse-chronological order       Signed Electronically by: Aric Stephens MD

## 2024-12-10 NOTE — PATIENT INSTRUCTIONS
Patient Education   Preventive Care Advice   This is general advice given by our system to help you stay healthy. However, your care team may have specific advice just for you. Please talk to your care team about your preventive care needs.  Nutrition  Eat 5 or more servings of fruits and vegetables each day.  Try wheat bread, brown rice and whole grain pasta (instead of white bread, rice, and pasta).  Get enough calcium and vitamin D. Check the label on foods and aim for 100% of the RDA (recommended daily allowance).  Lifestyle  Exercise at least 150 minutes each week  (30 minutes a day, 5 days a week).  Do muscle strengthening activities 2 days a week. These help control your weight and prevent disease.  No smoking.  Wear sunscreen to prevent skin cancer.  Have a dental exam and cleaning every 6 months.  Yearly exams  See your health care team every year to talk about:  Any changes in your health.  Any medicines your care team has prescribed.  Preventive care, family planning, and ways to prevent chronic diseases.  Shots (vaccines)   HPV shots (up to age 26), if you've never had them before.  Hepatitis B shots (up to age 59), if you've never had them before.  COVID-19 shot: Get this shot when it's due.  Flu shot: Get a flu shot every year.  Tetanus shot: Get a tetanus shot every 10 years.  Pneumococcal, hepatitis A, and RSV shots: Ask your care team if you need these based on your risk.  Shingles shot (for age 50 and up)  General health tests  Diabetes screening:  Starting at age 35, Get screened for diabetes at least every 3 years.  If you are younger than age 35, ask your care team if you should be screened for diabetes.  Cholesterol test: At age 39, start having a cholesterol test every 5 years, or more often if advised.  Bone density scan (DEXA): At age 50, ask your care team if you should have this scan for osteoporosis (brittle bones).  Hepatitis C: Get tested at least once in your life.  STIs (sexually  transmitted infections)  Before age 24: Ask your care team if you should be screened for STIs.  After age 24: Get screened for STIs if you're at risk. You are at risk for STIs (including HIV) if:  You are sexually active with more than one person.  You don't use condoms every time.  You or a partner was diagnosed with a sexually transmitted infection.  If you are at risk for HIV, ask about PrEP medicine to prevent HIV.  Get tested for HIV at least once in your life, whether you are at risk for HIV or not.  Cancer screening tests  Cervical cancer screening: If you have a cervix, begin getting regular cervical cancer screening tests starting at age 21.  Breast cancer scan (mammogram): If you've ever had breasts, begin having regular mammograms starting at age 40. This is a scan to check for breast cancer.  Colon cancer screening: It is important to start screening for colon cancer at age 45.  Have a colonoscopy test every 10 years (or more often if you're at risk) Or, ask your provider about stool tests like a FIT test every year or Cologuard test every 3 years.  To learn more about your testing options, visit:   .  For help making a decision, visit:   https://bit.ly/dt27636.  Prostate cancer screening test: If you have a prostate, ask your care team if a prostate cancer screening test (PSA) at age 55 is right for you.  Lung cancer screening: If you are a current or former smoker ages 50 to 80, ask your care team if ongoing lung cancer screenings are right for you.  For informational purposes only. Not to replace the advice of your health care provider. Copyright   2023 [facility]. All rights reserved. Clinically reviewed by the [facility] Transitions Program. [employer] 238370 - REV 01/24.  Learning About Activities of Daily Living  What are activities of daily living?     Activities of daily living (ADLs) are the basic self-care tasks you do every day. These include eating, bathing, dressing,  and moving around.  As you age, and if you have health problems, you may find that it's harder to do some of these tasks. If so, your doctor can suggest ideas that may help.  To measure what kind of help you may need, your doctor will ask how well you are able to do ADLs. Let your doctor know if there are any tasks that you are having trouble doing. This is an important first step to getting help. And when you have the help you need, you can stay as independent as possible.  How will a doctor assess your ADLs?  Asking about ADLs is part of a routine health checkup your doctor will likely do as you age. Your health check might be done in a doctor's office, in your home, or at a hospital. The goal is to find out if you are having any problems that could make it hard to care for yourself or that make it unsafe for you to be on your own.  To measure your ADLs, your doctor will ask how hard it is for you to do routine tasks. Your doctor may also want to know if you have changed the way you do a task because of a health problem. Your doctor may watch how you:  Walk back and forth.  Keep your balance while you stand or walk.  Move from sitting to standing or from a bed to a chair.  Button or unbutton a shirt or sweater.  Remove and put on your shoes.  It's common to feel a little worried or anxious if you find you can't do all the things you used to be able to do. Talking with your doctor about ADLs is a way to make sure you're as safe as possible and able to care for yourself as well as you can. You may want to bring a caregiver, friend, or family member to your checkup. They can help you talk to your doctor.  Follow-up care is a key part of your treatment and safety. Be sure to make and go to all appointments, and call your doctor if you are having problems. It's also a good idea to know your test results and keep a list of the medicines you take.  Current as of: October 24, 2023  Content Version: 14.2 2024 [facility]  [employer].   Care instructions adapted under license by your healthcare professional. If you have questions about a medical condition or this instruction, always ask your healthcare professional. [employer] disclaims any warranty or liability for your use of this information.

## 2024-12-16 ENCOUNTER — ANCILLARY PROCEDURE (OUTPATIENT)
Dept: GENERAL RADIOLOGY | Facility: CLINIC | Age: 69
End: 2024-12-16
Attending: PODIATRIST
Payer: COMMERCIAL

## 2024-12-16 ENCOUNTER — OFFICE VISIT (OUTPATIENT)
Dept: PODIATRY | Facility: CLINIC | Age: 69
End: 2024-12-16
Payer: COMMERCIAL

## 2024-12-16 VITALS
SYSTOLIC BLOOD PRESSURE: 138 MMHG | WEIGHT: 253 LBS | HEIGHT: 69 IN | BODY MASS INDEX: 37.47 KG/M2 | DIASTOLIC BLOOD PRESSURE: 72 MMHG

## 2024-12-16 DIAGNOSIS — M19.072 OSTEOARTHRITIS OF LEFT ANKLE: ICD-10-CM

## 2024-12-16 DIAGNOSIS — Q66.6 PES VALGUS: ICD-10-CM

## 2024-12-16 DIAGNOSIS — E11.9 TYPE 2 DIABETES MELLITUS WITHOUT COMPLICATION, WITHOUT LONG-TERM CURRENT USE OF INSULIN (H): ICD-10-CM

## 2024-12-16 DIAGNOSIS — M19.072 PRIMARY OSTEOARTHRITIS OF LEFT ANKLE: Primary | ICD-10-CM

## 2024-12-16 PROCEDURE — 99203 OFFICE O/P NEW LOW 30 MIN: CPT | Performed by: PODIATRIST

## 2024-12-16 PROCEDURE — 73610 X-RAY EXAM OF ANKLE: CPT | Mod: TC | Performed by: INTERNAL MEDICINE

## 2024-12-16 ASSESSMENT — PAIN SCALES - GENERAL: PAINLEVEL_OUTOF10: SEVERE PAIN (7)

## 2024-12-16 NOTE — PROGRESS NOTES
"HPI:  Barbie Portillo is a 69 year old male who is seen in consultation at the request of self.    Pt presents for eval of:   (Onset, Location, L/R, Character, Treatments, Injury if yes)    XR Left ankle 12/16/2024    DM type 2    DOS 1/4/2019 - ARTHRODESIS WITH POSSIBLE COTTON OSTEOTOMY LEFT FOOT(C-ARM); Surgeon Tariq Parada DPM, [facility]    Winter 2022, twisted Left ankle while shoveling snow on the roof. Worse with uneven terrain. Lateral left ankle pain    AFO fabricated 2022. Wears when he is going to be active in the yard.  Right ankle was \"tightened\" in 2011 and that was good.    Retired.     ROS:  10 point ROS neg other than the symptoms noted above in the HPI.    Patient Active Problem List   Diagnosis    Essential hypertension with goal blood pressure less than 140/90    SEVERE NASRIN (obstructive sleep apnea)    Type 2 diabetes mellitus without complication, without long-term current use of insulin (H)    Severe obesity (BMI 35.0-39.9) with comorbidity (H)    Primary osteoarthritis of left hip    Type 2 diabetes mellitus with albuminuria (H)    Primary osteoarthritis of left ankle    Vaccination declined by parent       PAST MEDICAL HISTORY:   Past Medical History:   Diagnosis Date    Essential hypertension, benign 2006    Hypertension, Benign    Obesity, unspecified     Old disruption of anterior cruciate ligament     ACL right knee     Other and unspecified disc disorder of unspecified region     Intervertebral disc disorders        PAST SURGICAL HISTORY:   Past Surgical History:   Procedure Laterality Date    ANKLE SURGERY  01/01/2011    right     ANKLE SURGERY Left 01/04/2019    ARTHRODESIS WITH POSSIBLE COTTON OSTEOTOMY LEFT FOOT(C-ARM); Surgeon Tariq Parada DPM, [facility]    COLONOSCOPY  10/22/2007    COLONOSCOPY N/A 01/15/2019    Procedure: COLONOSCOPY;  Surgeon: Yusef Garcia MD;  Location:  GI    SURGICAL HISTORY OF -   04/04/1984    Bilateral testicular biopsy "    TOTAL HIP ARTHROPLASTY Left 04/2021        MEDICATIONS:   Current Outpatient Medications:     aspirin 81 MG tablet, Take 1 tablet (81 mg) by mouth daily, Disp: 30 tablet, Rfl:     blood glucose (NO BRAND SPECIFIED) lancets standard, Use to test blood sugar one time daily., Disp: 100 Lancet, Rfl: 4    blood glucose (NO BRAND SPECIFIED) test strip, Use to test blood sugar one time daily., Disp: 100 strip, Rfl: 4    blood glucose monitoring (NO BRAND SPECIFIED) meter device kit, Use to test blood sugar 1-2 times daily or as directed., Disp: 1 kit, Rfl: 0    hydrochlorothiazide (HYDRODIURIL) 25 MG tablet, Take 1 tablet (25 mg) by mouth every morning., Disp: 90 tablet, Rfl: 4    Lactobacillus (PROBIOTIC ACIDOPHILUS PO), , Disp: , Rfl:     lisinopril (ZESTRIL) 40 MG tablet, Take 1 tablet (40 mg) by mouth daily., Disp: 90 tablet, Rfl: 4    metoprolol succinate ER (TOPROL XL) 200 MG 24 hr tablet, Take 1 tablet (200 mg) by mouth daily., Disp: 90 tablet, Rfl: 4    Multiple Vitamin (DAILY MULTIVITAMIN PO), Take 1 tablet by mouth daily., Disp: , Rfl:     spironolactone (ALDACTONE) 25 MG tablet, Take 0.5 tablets (12.5 mg) by mouth daily., Disp: 45 tablet, Rfl: 4    thin (NO BRAND SPECIFIED) lancets, Use to test blood sugar 3 times daily or as directed. To accompany: Blood Glucose Monitor Brands: per insurance., Disp: 200 each, Rfl: 6     ALLERGIES:  No Known Allergies     SOCIAL HISTORY:   Social History     Socioeconomic History    Marital status:      Spouse name: Not on file    Number of children: Not on file    Years of education: Not on file    Highest education level: Not on file   Occupational History     Employer: [employer]     Comment: / at [employer]   Tobacco Use    Smoking status: Never    Smokeless tobacco: Never   Vaping Use    Vaping status: Never Used   Substance and Sexual Activity    Alcohol use: No    Drug use: No    Sexual activity: Yes     Partners: Female   Other Topics  Concern     Service Not Asked    Blood Transfusions Not Asked    Caffeine Concern No    Occupational Exposure Not Asked    Hobby Hazards Not Asked    Sleep Concern Yes    Stress Concern Not Asked    Weight Concern Not Asked    Special Diet Not Asked    Back Care Not Asked    Exercise Not Asked    Bike Helmet Not Asked    Seat Belt Not Asked    Self-Exams Not Asked    Parent/sibling w/ CABG, MI or angioplasty before 65F 55M? Not Asked   Social History Narrative    Not on file     Social Drivers of Health     Financial Resource Strain: Low Risk  (12/10/2024)    Financial Resource Strain     Within the past 12 months, have you or your family members you live with been unable to get utilities (heat, electricity) when it was really needed?: No   Food Insecurity: Low Risk  (12/10/2024)    Food Insecurity     Within the past 12 months, did you worry that your food would run out before you got money to buy more?: No     Within the past 12 months, did the food you bought just not last and you didn t have money to get more?: No   Transportation Needs: Low Risk  (12/10/2024)    Transportation Needs     Within the past 12 months, has lack of transportation kept you from medical appointments, getting your medicines, non-medical meetings or appointments, work, or from getting things that you need?: No   Physical Activity: Insufficiently Active (12/10/2024)    Exercise Vital Sign     Days of Exercise per Week: 3 days     Minutes of Exercise per Session: 20 min   Stress: No Stress Concern Present (12/10/2024)    [place] [employer] - Occupational Stress Questionnaire     Feeling of Stress : Not at all   Social Connections: Unknown (12/10/2024)    Social Connection and Isolation Panel [NHANES]     Frequency of Communication with Friends and Family: Not on file     Frequency of Social Gatherings with Friends and Family: Once a week     Attends Yarsani Services: Not on file     Active Member of Clubs or  "Organizations: Not on file     Attends Club or Organization Meetings: Not on file     Marital Status: Not on file   Interpersonal Safety: Low Risk  (12/10/2024)    Interpersonal Safety     Do you feel physically and emotionally safe where you currently live?: Yes     Within the past 12 months, have you been hit, slapped, kicked or otherwise physically hurt by someone?: No     Within the past 12 months, have you been humiliated or emotionally abused in other ways by your partner or ex-partner?: No   Housing Stability: Low Risk  (12/10/2024)    Housing Stability     Do you have housing? : Yes     Are you worried about losing your housing?: No        FAMILY HISTORY:   Family History   Problem Relation Age of Onset    C.A.D. Father         snores    Diabetes Brother         x2    Hypertension Mother     Arthritis Mother     Prostate Cancer Maternal Grandfather         EXAM:Vitals: /72 (BP Location: Left arm, Patient Position: Sitting, Cuff Size: Adult Large)   Ht 1.75 m (5' 8.9\")   Wt 114.8 kg (253 lb)   BMI 37.47 kg/m    BMI= Body mass index is 37.47 kg/m .    General appearance: Patient is alert and fully cooperative with history & exam.  No sign of distress is noted during the visit.     Psychiatric: Affect is pleasant & appropriate.  Patient appears motivated to improve health.     Respiratory: Breathing is regular & unlabored while sitting.     HEENT: Hearing is intact to spoken word.  Speech is clear.  No gross evidence of visual impairment that would impact ambulation.     Vascular: DP & PT pulses are intact & regular bilaterally.  Mild generalized edema and varicosities bilateral.  Hair growth is mildly diminished but present about the lower extremity.    Neurologic: Lower extremity sensation is intact to light touch.  Plantar response equal intact bilateral.  Mild bilateral neuropathy noted without complete loss of protective threshold.  Mild paresthesias noted.    Dermatologic: Skin is intact to both " lower extremities with adequate texture, turgor and tone about the integument.  No paronychia or evidence of soft tissue infection is noted.     Musculoskeletal: Patient is ambulatory without assistive device or brace.  Patient does have gross valgus bilateral.  Valgus calcaneus left greater than right.  Limited range of motion through the ankle subtalar joint with crepitus.  Severely limited through the subtalar and midtarsal joint consistent with union of previous fixation.    Radiographs: Moderate to severe degenerative changes noted throughout the talar dome bilateral with medial narrowing and reasonable joint space laterally.  Previous arthrodesis of the subtalar joint midfoot on the left.    Hemoglobin A1C (%)   Date Value   12/10/2024 7.1 (H)   12/08/2023 6.5 (H)   12/01/2022 6.6 (H)   08/24/2022 7.1 (H)   03/30/2021 6.7 (H)   07/22/2020 6.7 (H)   07/02/2019 6.3 (H)   06/30/2018 6.2 (H)   07/08/2017 6.0   06/27/2016 6.1 (H)   10/12/2015 6.0   06/18/2015 8.4 (H)     Creatinine (mg/dL)   Date Value   12/10/2024 1.16   02/12/2024 1.04   12/08/2023 1.23 (H)   01/13/2023 0.80   12/01/2022 0.86   08/24/2022 0.79   04/01/2021 0.82   07/22/2020 0.84   12/18/2018 0.84   06/30/2018 0.79   07/08/2017 0.76   06/27/2016 0.76       ASSESSMENT:       ICD-10-CM    1. Primary osteoarthritis of left ankle  M19.072 Orthopedic  Referral           PLAN:  Reviewed patient's chart in [facility].      12/16/2024    Previous subtalar joint arthrodesis of the midfoot and calcaneus cuboid.  Moderate degenerative changes bilateral ankles.  Discussed the importance of appropriate shoe gear arch support protection.  Sometimes over-the-counter bracing can be helpful versus custom molded bracing and the differences were discussed.  Injections have limitations but provide some knowledge.  Pantalar arthrodesis is another option when exhausted.    Ignacio Alcantara DPM

## 2024-12-16 NOTE — LETTER
"12/16/2024      Barbie Portillo  [address] [facility] [address]      Dear Colleague,    Thank you for referring your patient, Barbie Portillo, to the [facility]. Please see a copy of my visit note below.    HPI:  Barbie Portillo is a 69 year old male who is seen in consultation at the request of self.    Pt presents for eval of:   (Onset, Location, L/R, Character, Treatments, Injury if yes)    XR Left ankle 12/16/2024    DM type 2    DOS 1/4/2019 - ARTHRODESIS WITH POSSIBLE COTTON OSTEOTOMY LEFT FOOT(C-ARM); Surgeon Tariq Parada, [facility], [facility]    Winter 2022, twisted Left ankle while shoveling snow on the roof. Worse with uneven terrain. Lateral left ankle pain    AFO fabricated 2022. Wears when he is going to be active in the yard.  Right ankle was \"tightened\" in 2011 and that was good.    Retired.     ROS:  10 point ROS neg other than the symptoms noted above in the HPI.    Patient Active Problem List   Diagnosis     Essential hypertension with goal blood pressure less than 140/90     SEVERE NASRIN (obstructive sleep apnea)     Type 2 diabetes mellitus without complication, without long-term current use of insulin (H)     Severe obesity (BMI 35.0-39.9) with comorbidity (H)     Primary osteoarthritis of left hip     Type 2 diabetes mellitus with albuminuria (H)     Primary osteoarthritis of left ankle     Vaccination declined by parent       PAST MEDICAL HISTORY:   Past Medical History:   Diagnosis Date     Essential hypertension, benign 2006    Hypertension, Benign     Obesity, unspecified      Old disruption of anterior cruciate ligament     ACL right knee      Other and unspecified disc disorder of unspecified region     Intervertebral disc disorders        PAST SURGICAL HISTORY:   Past Surgical History:   Procedure Laterality Date     ANKLE SURGERY  01/01/2011    right      ANKLE SURGERY Left 01/04/2019    ARTHRODESIS WITH POSSIBLE COTTON OSTEOTOMY LEFT " FOOT(C-ARM); Surgeon Tariq Parada DPM, [facility]     COLONOSCOPY  10/22/2007     COLONOSCOPY N/A 01/15/2019    Procedure: COLONOSCOPY;  Surgeon: Yusef Garcia MD;  Location:  GI     SURGICAL HISTORY OF -   04/04/1984    Bilateral testicular biopsy     TOTAL HIP ARTHROPLASTY Left 04/2021        MEDICATIONS:   Current Outpatient Medications:      aspirin 81 MG tablet, Take 1 tablet (81 mg) by mouth daily, Disp: 30 tablet, Rfl:      blood glucose (NO BRAND SPECIFIED) lancets standard, Use to test blood sugar one time daily., Disp: 100 Lancet, Rfl: 4     blood glucose (NO BRAND SPECIFIED) test strip, Use to test blood sugar one time daily., Disp: 100 strip, Rfl: 4     blood glucose monitoring (NO BRAND SPECIFIED) meter device kit, Use to test blood sugar 1-2 times daily or as directed., Disp: 1 kit, Rfl: 0     hydrochlorothiazide (HYDRODIURIL) 25 MG tablet, Take 1 tablet (25 mg) by mouth every morning., Disp: 90 tablet, Rfl: 4     Lactobacillus (PROBIOTIC ACIDOPHILUS PO), , Disp: , Rfl:      lisinopril (ZESTRIL) 40 MG tablet, Take 1 tablet (40 mg) by mouth daily., Disp: 90 tablet, Rfl: 4     metoprolol succinate ER (TOPROL XL) 200 MG 24 hr tablet, Take 1 tablet (200 mg) by mouth daily., Disp: 90 tablet, Rfl: 4     Multiple Vitamin (DAILY MULTIVITAMIN PO), Take 1 tablet by mouth daily., Disp: , Rfl:      spironolactone (ALDACTONE) 25 MG tablet, Take 0.5 tablets (12.5 mg) by mouth daily., Disp: 45 tablet, Rfl: 4     thin (NO BRAND SPECIFIED) lancets, Use to test blood sugar 3 times daily or as directed. To accompany: Blood Glucose Monitor Brands: per insurance., Disp: 200 each, Rfl: 6     ALLERGIES:  No Known Allergies     SOCIAL HISTORY:   Social History     Socioeconomic History     Marital status:      Spouse name: Not on file     Number of children: Not on file     Years of education: Not on file     Highest education level: Not on file   Occupational History     Employer: [employer]      Comment: / at [employer]   Tobacco Use     Smoking status: Never     Smokeless tobacco: Never   Vaping Use     Vaping status: Never Used   Substance and Sexual Activity     Alcohol use: No     Drug use: No     Sexual activity: Yes     Partners: Female   Other Topics Concern      Service Not Asked     Blood Transfusions Not Asked     Caffeine Concern No     Occupational Exposure Not Asked     Hobby Hazards Not Asked     Sleep Concern Yes     Stress Concern Not Asked     Weight Concern Not Asked     Special Diet Not Asked     Back Care Not Asked     Exercise Not Asked     Bike Helmet Not Asked     Seat Belt Not Asked     Self-Exams Not Asked     Parent/sibling w/ CABG, MI or angioplasty before 65F 55M? Not Asked   Social History Narrative     Not on file     Social Drivers of Health     Financial Resource Strain: Low Risk  (12/10/2024)    Financial Resource Strain      Within the past 12 months, have you or your family members you live with been unable to get utilities (heat, electricity) when it was really needed?: No   Food Insecurity: Low Risk  (12/10/2024)    Food Insecurity      Within the past 12 months, did you worry that your food would run out before you got money to buy more?: No      Within the past 12 months, did the food you bought just not last and you didn t have money to get more?: No   Transportation Needs: Low Risk  (12/10/2024)    Transportation Needs      Within the past 12 months, has lack of transportation kept you from medical appointments, getting your medicines, non-medical meetings or appointments, work, or from getting things that you need?: No   Physical Activity: Insufficiently Active (12/10/2024)    Exercise Vital Sign      Days of Exercise per Week: 3 days      Minutes of Exercise per Session: 20 min   Stress: No Stress Concern Present (12/10/2024)    [place] [employer] - Occupational Stress Questionnaire      Feeling of Stress : Not at  "all   Social Connections: Unknown (12/10/2024)    Social Connection and Isolation Panel [NHANES]      Frequency of Communication with Friends and Family: Not on file      Frequency of Social Gatherings with Friends and Family: Once a week      Attends Jainism Services: Not on file      Active Member of Clubs or Organizations: Not on file      Attends Club or Organization Meetings: Not on file      Marital Status: Not on file   Interpersonal Safety: Low Risk  (12/10/2024)    Interpersonal Safety      Do you feel physically and emotionally safe where you currently live?: Yes      Within the past 12 months, have you been hit, slapped, kicked or otherwise physically hurt by someone?: No      Within the past 12 months, have you been humiliated or emotionally abused in other ways by your partner or ex-partner?: No   Housing Stability: Low Risk  (12/10/2024)    Housing Stability      Do you have housing? : Yes      Are you worried about losing your housing?: No        FAMILY HISTORY:   Family History   Problem Relation Age of Onset     C.A.D. Father         snores     Diabetes Brother         x2     Hypertension Mother      Arthritis Mother      Prostate Cancer Maternal Grandfather         EXAM:Vitals: /72 (BP Location: Left arm, Patient Position: Sitting, Cuff Size: Adult Large)   Ht 1.75 m (5' 8.9\")   Wt 114.8 kg (253 lb)   BMI 37.47 kg/m    BMI= Body mass index is 37.47 kg/m .    General appearance: Patient is alert and fully cooperative with history & exam.  No sign of distress is noted during the visit.     Psychiatric: Affect is pleasant & appropriate.  Patient appears motivated to improve health.     Respiratory: Breathing is regular & unlabored while sitting.     HEENT: Hearing is intact to spoken word.  Speech is clear.  No gross evidence of visual impairment that would impact ambulation.     Vascular: DP & PT pulses are intact & regular bilaterally.  Mild generalized edema and varicosities bilateral.  " Hair growth is mildly diminished but present about the lower extremity.    Neurologic: Lower extremity sensation is intact to light touch.  Plantar response equal intact bilateral.  Mild bilateral neuropathy noted without complete loss of protective threshold.  Mild paresthesias noted.    Dermatologic: Skin is intact to both lower extremities with adequate texture, turgor and tone about the integument.  No paronychia or evidence of soft tissue infection is noted.     Musculoskeletal: Patient is ambulatory without assistive device or brace.  Patient does have gross valgus bilateral.  Valgus calcaneus left greater than right.  Limited range of motion through the ankle subtalar joint with crepitus.  Severely limited through the subtalar and midtarsal joint consistent with union of previous fixation.    Radiographs: Moderate to severe degenerative changes noted throughout the talar dome bilateral with medial narrowing and reasonable joint space laterally.  Previous arthrodesis of the subtalar joint midfoot on the left.    Hemoglobin A1C (%)   Date Value   12/10/2024 7.1 (H)   12/08/2023 6.5 (H)   12/01/2022 6.6 (H)   08/24/2022 7.1 (H)   03/30/2021 6.7 (H)   07/22/2020 6.7 (H)   07/02/2019 6.3 (H)   06/30/2018 6.2 (H)   07/08/2017 6.0   06/27/2016 6.1 (H)   10/12/2015 6.0   06/18/2015 8.4 (H)     Creatinine (mg/dL)   Date Value   12/10/2024 1.16   02/12/2024 1.04   12/08/2023 1.23 (H)   01/13/2023 0.80   12/01/2022 0.86   08/24/2022 0.79   04/01/2021 0.82   07/22/2020 0.84   12/18/2018 0.84   06/30/2018 0.79   07/08/2017 0.76   06/27/2016 0.76       ASSESSMENT:       ICD-10-CM    1. Primary osteoarthritis of left ankle  M19.072 Orthopedic  Referral           PLAN:  Reviewed patient's chart in [facility].      12/16/2024    Previous subtalar joint arthrodesis of the midfoot and calcaneus cuboid.  Moderate degenerative changes bilateral ankles.  Discussed the importance of appropriate shoe gear arch support protection.   Sometimes over-the-counter bracing can be helpful versus custom molded bracing and the differences were discussed.  Injections have limitations but provide some knowledge.  Pantalar arthrodesis is another option when exhausted.    Ignacio Alcantara DPM          Again, thank you for allowing me to participate in the care of your patient.        Sincerely,        Ignacio Alcantara DPM

## 2025-05-12 ENCOUNTER — PATIENT OUTREACH (OUTPATIENT)
Dept: CARE COORDINATION | Facility: CLINIC | Age: 70
End: 2025-05-12
Payer: COMMERCIAL

## 2025-05-13 NOTE — PATIENT INSTRUCTIONS
Try 1% hydrocortisone cream twice daily for a couple of weeks.  If this is not effective after 1-2 weeks or if at any points it worsens, start using Lotrimin (clotrimazole) over the counter antifungal cream twice daily.      Use both creams together if the 1% hydrocortisone cream is partially effective but not fully effective.      You may also try the clotrimazole cream first.     Treatment Goal Explanation (Does Not Render In The Note): Stable for the purposes of categorizing medical decision making is defined by the specific treatment goals for an individual patient. A patient that is not at their treatment goal is not stable, even if the condition has not changed and there is no short- term threat to life or function. Treatment Goal Met?: no

## 2025-06-10 ENCOUNTER — OFFICE VISIT (OUTPATIENT)
Dept: FAMILY MEDICINE | Facility: CLINIC | Age: 70
End: 2025-06-10
Payer: COMMERCIAL

## 2025-06-10 ENCOUNTER — RESULTS FOLLOW-UP (OUTPATIENT)
Dept: FAMILY MEDICINE | Facility: CLINIC | Age: 70
End: 2025-06-10

## 2025-06-10 ENCOUNTER — TELEPHONE (OUTPATIENT)
Dept: FAMILY MEDICINE | Facility: CLINIC | Age: 70
End: 2025-06-10

## 2025-06-10 VITALS
DIASTOLIC BLOOD PRESSURE: 86 MMHG | HEIGHT: 69 IN | BODY MASS INDEX: 36.56 KG/M2 | SYSTOLIC BLOOD PRESSURE: 136 MMHG | WEIGHT: 246.8 LBS | TEMPERATURE: 98.1 F | RESPIRATION RATE: 16 BRPM | OXYGEN SATURATION: 98 % | HEART RATE: 60 BPM

## 2025-06-10 DIAGNOSIS — R80.9 TYPE 2 DIABETES MELLITUS WITH ALBUMINURIA (H): Primary | ICD-10-CM

## 2025-06-10 DIAGNOSIS — E11.29 TYPE 2 DIABETES MELLITUS WITH ALBUMINURIA (H): Primary | ICD-10-CM

## 2025-06-10 DIAGNOSIS — E66.01 SEVERE OBESITY (BMI 35.0-39.9) WITH COMORBIDITY (H): ICD-10-CM

## 2025-06-10 DIAGNOSIS — M71.30 SYNOVIAL CYST: ICD-10-CM

## 2025-06-10 LAB
EST. AVERAGE GLUCOSE BLD GHB EST-MCNC: 169 MG/DL
HBA1C MFR BLD: 7.5 %

## 2025-06-10 PROCEDURE — 3079F DIAST BP 80-89 MM HG: CPT | Performed by: FAMILY MEDICINE

## 2025-06-10 PROCEDURE — G2211 COMPLEX E/M VISIT ADD ON: HCPCS | Performed by: FAMILY MEDICINE

## 2025-06-10 PROCEDURE — 3075F SYST BP GE 130 - 139MM HG: CPT | Performed by: FAMILY MEDICINE

## 2025-06-10 PROCEDURE — 1125F AMNT PAIN NOTED PAIN PRSNT: CPT | Performed by: FAMILY MEDICINE

## 2025-06-10 PROCEDURE — 99214 OFFICE O/P EST MOD 30 MIN: CPT | Performed by: FAMILY MEDICINE

## 2025-06-10 PROCEDURE — 36415 COLL VENOUS BLD VENIPUNCTURE: CPT | Performed by: FAMILY MEDICINE

## 2025-06-10 PROCEDURE — 83036 HEMOGLOBIN GLYCOSYLATED A1C: CPT | Performed by: FAMILY MEDICINE

## 2025-06-10 ASSESSMENT — PAIN SCALES - GENERAL: PAINLEVEL_OUTOF10: MODERATE PAIN (6)

## 2025-06-10 NOTE — PATIENT INSTRUCTIONS
Based on our discussion, I have outlined the following instructions for you:      - Begin taking jardiance, to help your blood sugar and reduce your risk of kidney disease.  - Meet with a diabetes educator regularly over the next 3 months to help manage your diabetes.  - Get lancets and test strips so you can check your blood sugar levels at home.  - Keep up with your exercise routine, like biking, to help manage your weight and control your diabetes.    Thank you again for your visit, and we look forward to supporting you in your journey to better health.

## 2025-06-10 NOTE — TELEPHONE ENCOUNTER
We got a script today for the Jardiance 25 mg and the insurance does not cover it very well. For a one month supply it is $342.00 and I spoke with the patient about this and they wanted me to contact you and see if there is something else they could try as they do not want to pay this and they are not going to pick it up. If you have any questions feel free to reach out.     Thank you,   Olga Tse, Pharmacy [facility]

## 2025-06-10 NOTE — PROGRESS NOTES
Assessment & Plan     ASSESSMENT/ORDERS:    ICD-10-CM    1. Type 2 diabetes mellitus with albuminuria (H)  E11.29 HEMOGLOBIN A1C    R80.9 empagliflozin (JARDIANCE) 25 MG TABS tablet     Adult Diabetes Education  Referral     blood glucose (NO BRAND SPECIFIED) test strip     blood glucose (NO BRAND SPECIFIED) lancets standard      2. Severe obesity (BMI 35.0-39.9) with comorbidity (H)  E66.01       3. Synovial cyst  M71.30           Assessment & Plan  Type 2 diabetes mellitus with albuminuria (H)  - Elevated protein in urine indicates albuminuria. Blood sugar levels have been high since steroid injection on April 25, 2025, but have decreased over time. Metformin is not tolerated due to gastrointestinal side effects. Risk of chronic kidney disease due to albuminuria.  - Start jardiance, as it is usually covered by insurance and is well tolerated. Refer to diabetic educator for close follow-up over the next 3 months. Schedule a 3-month follow-up appointment. Prescribe lancets and test strips for blood sugar monitoring.  - Risks and side effects: Increased risk of genital area yeast infection or urinary tract infection due to higher sugar concentration in urine.    Severe obesity (BMI 35.0-39.9) with comorbidity (H)  - Weight has decreased from 253 lbs to 246 lbs since last visit. Weight loss is beneficial for diabetes management.  - Continue exercise, such as biking, for weight management and diabetes control.    Synovial cyst  - Soft tissue mass on the dorsal wrist near the base of the thumb, consistent with a synovial cyst.  - Monitor for changes in size or pain. No immediate intervention required unless symptoms worsen.    The longitudinal plan of care for the diagnosis(es)/condition(s) as documented were addressed during this visit. Due to the added complexity in care, I will continue to support Barbie in the subsequent management and with ongoing continuity of care.            BMI  Estimated body mass  "index is 36.55 kg/m  as calculated from the following:    Height as of this encounter: 1.75 m (5' 8.9\").    Weight as of this encounter: 111.9 kg (246 lb 12.8 oz).     Blood sugar testing frequency justification:  Uncontrolled diabetes and recent elevated blood sugars due to corticosteroid injection.        Isidoro Valentin is a 69 year old, presenting for the following health issues:  Diabetes and Hypertension        6/10/2025     7:50 AM   Additional Questions   Roomed by Jody ALEX     History of Present Illness       Diabetes:   He presents for follow up of diabetes.  He is checking home blood glucose two times daily.   He checks blood glucose before meals.  Blood glucose is sometimes over 200 and never under 70.  When his blood glucose is low, the patient is asymptomatic for confusion, blurred vision, lethargy and reports not feeling dizzy, shaky, or weak.   He has no concerns regarding his diabetes at this time.   He is not experiencing numbness or burning in feet, excessive thirst, blurry vision, weight changes or redness, sores or blisters on feet.           Hypertension: He presents for follow up of hypertension.  He does check blood pressure  regularly outside of the clinic. Outpatient blood pressures have not been over 140/90. He follows a low salt diet.     He eats 2-3 servings of fruits and vegetables daily.He consumes 1 sweetened beverage(s) daily.He exercises with enough effort to increase his heart rate 10 to 19 minutes per day.  He exercises with enough effort to increase his heart rate 4 days per week.   He is taking medications regularly.   - Barbie Portillo, 69-year-old male.  - Has been experiencing issues with left ankle arthritis, with moderate degenerative changes noted.  - Previously consulted with Doctor Alcantara on December 16, 2024, who confirmed the condition and discussed the importance of appropriate shoe gear and arch support.  - Over-the-counter bracing and injections have been " "considered but have limitations.  - Pantailor arthrodesis was mentioned as a potential option if other treatments are exhausted.  - Underwent a previous surgery on the ankle, but no further surgical intervention is currently recommended.  - Received a steroid injection in the hip on April 25, 2025, which caused blood sugar levels to spike to 307 mg/dL for three days before returning below 200 mg/dL.  - Currently managing diabetes without medication due to intolerance to metformin, which previously caused gastrointestinal upset and diarrhea.  - Previously elevated protein in urine (albuminuria) associated with diabetes.  - Engages in regular exercise by riding a bike despite ankle issues and has lost weight, reducing from 253 lbs to 246 lbs since the last visit.  - Monitoring blood sugar levels twice daily and reports concerns about diabetes management.  Got bit by a wood tick in January and still has  a little delia                 Objective    /86   Pulse 60   Temp 98.1  F (36.7  C) (Temporal)   Resp 16   Ht 1.75 m (5' 8.9\")   Wt 111.9 kg (246 lb 12.8 oz)   SpO2 98%   BMI 36.55 kg/m    Body mass index is 36.55 kg/m .  Physical Exam   - CARDIOVASCULAR: Heart auscultation performed.  - LUNGS: Lung auscultation with normal and deep breathing maneuvers.  - MUSCULOSKELETAL: Examination of a soft tissue mass on the dorsal wrist near the base of the thumb.            Signed Electronically by: Aric Stephens MD    "

## 2025-06-10 NOTE — TELEPHONE ENCOUNTER
Sending to [facility] to see what options exist for cost for SGLT2 inhibitor for patient.    Aric Stephens MD

## 2025-06-10 NOTE — LETTER
Susanna 10, 2025      Barbie Portillo  [address] [facility] [address]                Dear ,    Test results indicate hemoglobin A1c is slightly higher. Good thing to be on something for diabetes now.      Resulted Orders   HEMOGLOBIN A1C   Result Value Ref Range    Estimated Average Glucose 169 (H) <117 mg/dL    Hemoglobin A1C 7.5 (H) <5.7 %      Comment:      Normal <5.7%   Prediabetes 5.7-6.4%    Diabetes 6.5% or higher     Note: Adopted from ADA consensus guidelines.       If you have any questions or concerns, please call the clinic at the number listed above.       Sincerely,  Your Care Team     Aric Stephens MD    Electronically signed

## 2025-06-10 NOTE — TELEPHONE ENCOUNTER
Will have staff notify patient that we can send a prescription to [facility] for an equivalent medication that is about $50/month.  This would be an option for him.    If he wants to review other options, we could have him review with our [facility] pharmacist for better cost medication options. If he wants to do this, let me know so we can place a referral for this.    Aric Stephens MD

## 2025-06-11 ENCOUNTER — PATIENT OUTREACH (OUTPATIENT)
Dept: CARE COORDINATION | Facility: CLINIC | Age: 70
End: 2025-06-11
Payer: COMMERCIAL

## 2025-06-12 NOTE — TELEPHONE ENCOUNTER
Patient was contacted and would like to get a referral to [facility] Pharmacist to discuss all medication options due to costs.

## 2025-06-12 NOTE — TELEPHONE ENCOUNTER
Orders signed.  Sending back to team to follow-up on notification and/or subsequent scheduling.    Aric Stephens MD

## 2025-07-08 ENCOUNTER — ALLIED HEALTH/NURSE VISIT (OUTPATIENT)
Dept: EDUCATION SERVICES | Facility: CLINIC | Age: 70
End: 2025-07-08
Attending: FAMILY MEDICINE
Payer: COMMERCIAL

## 2025-07-08 DIAGNOSIS — R80.9 TYPE 2 DIABETES MELLITUS WITH ALBUMINURIA (H): ICD-10-CM

## 2025-07-08 DIAGNOSIS — E11.29 TYPE 2 DIABETES MELLITUS WITH ALBUMINURIA (H): ICD-10-CM

## 2025-07-08 PROCEDURE — G0108 DIAB MANAGE TRN  PER INDIV: HCPCS | Mod: AE | Performed by: PHARMACIST

## 2025-07-08 PROCEDURE — 95250 CONT GLUC MNTR PHYS/QHP EQP: CPT | Performed by: PHARMACIST

## 2025-07-08 NOTE — PROGRESS NOTES
Diabetes Self-Management Education & Support    Assessment    Patient comments/concerns: Patient does not have any questions or concerns    Professional CGM study indicated for: Other     Dexcom Pro CGM placed- see below.  We also spent a lot of time talking about general diabetes nutrition including importance of balance, protein with all meals and snacks, ww vs white, pizza, cereal, caffeine, eating protein first, etc.  All things we will also review more when he comes back and we review his CGM.  He may even want to consider cash paying for 2 Libres to have real time data if he has a lot of things he can work on.    Intervention  Sensor started today.   Sensor Type: DexCom  Lot #: [id number]  Serial #: [id number]  Expiration Date: 03/31/26     Sensor was inserted with no resistance or bleeding at insertion site.      Pt will plan to wear the sensor through 7/19/25    Dexcom Pro-specific education provided on: Sensor insertion, intention of monitoring for 10 days. Keep records of BG, food intake, exercise, and medication dosing during wear. CGM placed on back of arm per patient request.  This is a blinded study.    Patient verbalized understanding of diabetes self-management education concepts discussed, opportunities for ongoing education and support, and recommendations provided today.    Educational and other materials:  Food/exercise/medication log sheets    Plan  Patient was given instructions for tracking blood glucose medications, food intake and activity.  Patient to return all items associated with the professional Continuous Glucose Monitor System.  See Patient Instructions.    Subjective/Objective  Barbie is an 69 year old, presenting for the following diabetes education related to: Professional CGM Start    Barbie is here for diabetes education, referred by his PCP, Dr. Stephens, but has been working with Zain Smith in [facility].  Barbie is here to have a Dexcom Pro CGM placed to help him work on lifestyle  management of his diabetes as he prefers not to take any medication.      Lab Results   Component Value Date    A1C 7.5 06/10/2025    A1C 7.1 12/10/2024    A1C 6.5 12/08/2023    A1C 6.6 12/01/2022    A1C 7.1 08/24/2022    A1C 6.7 03/30/2021    A1C 6.7 07/22/2020    A1C 6.3 07/02/2019    A1C 6.2 06/30/2018    A1C 6.0 07/08/2017     Barbie had steroid injection in hip April 25th and BS went over 300.  BS was high for weeks after the injection.    Also had old ankle injury that limits activity.  Likes to swim, was going to check out gym in town so he could swim laps.    BGM- testing AM, before lunch and sometimes before supper.  So, 2 to 3 times daily.  Reports average in meter right now for the last week is in the 150s.  Seems to always be higher in the morning.  Was on 1 metformin (non XR) in the past and had diarrhea.  Was taking it with breakfast.    Only eats 3 meals per day, not really a snacker.    Drinks tea and coffee with FV creamer, small amount, both with caffeine. Wife watches his diet closely.  Following calories and not necessarily carbs.  Has whole wheat bread, brown rice, etc.  He does eat potatoes, pasta and pizza maybe only 1 to 2 times per month.        Gracie Mora, PharmD, [facility] and [place] [facility]    Time spent in DSMT: 42 minutes   Time spent in CGM insertion: 10 minutes, in addition to time spent in DSMT  Encounter Type: Individual

## 2025-07-08 NOTE — LETTER
7/8/2025         RE: Barbie Portillo  [address] [facility] [address]        Dear Colleague,    Thank you for referring your patient, Barbie Portillo, to the [facility]ET[facility] [place]. Please see a copy of my visit note below.    Diabetes Self-Management Education & Support    Assessment    Patient comments/concerns: Patient does not have any questions or concerns    Professional CGM study indicated for: Other     Dexcom Pro CGM placed- see below.  We also spent a lot of time talking about general diabetes nutrition including importance of balance, protein with all meals and snacks, ww vs white, pizza, cereal, caffeine, eating protein first, etc.  All things we will also review more when he comes back and we review his CGM.  He may even want to consider cash paying for 2 Libres to have real time data if he has a lot of things he can work on.    Intervention  Sensor started today.   Sensor Type: DexCom  Lot #: [id number]  Serial #: [id number]  Expiration Date: 03/31/26     Sensor was inserted with no resistance or bleeding at insertion site.      Pt will plan to wear the sensor through 7/19/25    Dexcom Pro-specific education provided on: Sensor insertion, intention of monitoring for 10 days. Keep records of BG, food intake, exercise, and medication dosing during wear. CGM placed on back of arm per patient request.  This is a blinded study.    Patient verbalized understanding of diabetes self-management education concepts discussed, opportunities for ongoing education and support, and recommendations provided today.    Educational and other materials:  Food/exercise/medication log sheets    Plan  Patient was given instructions for tracking blood glucose medications, food intake and activity.  Patient to return all items associated with the professional Continuous Glucose Monitor System.  See Patient Instructions.    Subjective/Objective  Barbie is an 69 year old, presenting for the  following diabetes education related to: Professional CGM Start    Barbie is here for diabetes education, referred by his PCP, Dr. Stephens, but has been working with Zain Smith in [facility].  Babrie is here to have a Dexcom Pro CGM placed to help him work on lifestyle management of his diabetes as he prefers not to take any medication.      Lab Results   Component Value Date    A1C 7.5 06/10/2025    A1C 7.1 12/10/2024    A1C 6.5 12/08/2023    A1C 6.6 12/01/2022    A1C 7.1 08/24/2022    A1C 6.7 03/30/2021    A1C 6.7 07/22/2020    A1C 6.3 07/02/2019    A1C 6.2 06/30/2018    A1C 6.0 07/08/2017     Barbie had steroid injection in hip April 25th and BS went over 300.  BS was high for weeks after the injection.    Also had old ankle injury that limits activity.  Likes to swim, was going to check out gym in town so he could swim laps.    BGM- testing AM, before lunch and sometimes before supper.  So, 2 to 3 times daily.  Reports average in meter right now for the last week is in the 150s.  Seems to always be higher in the morning.  Was on 1 metformin (non XR) in the past and had diarrhea.  Was taking it with breakfast.    Only eats 3 meals per day, not really a snacker.    Drinks tea and coffee with FV creamer, small amount, both with caffeine. Wife watches his diet closely.  Following calories and not necessarily carbs.  Has whole wheat bread, brown rice, etc.  He does eat potatoes, pasta and pizza maybe only 1 to 2 times per month.        Gracie Mora, PharmD, [facility] [place], BC-ADM  [place] [facility] [place] [facility]    Time spent in DSMT: 42 minutes   Time spent in CGM insertion: 10 minutes, in addition to time spent in DSMT  Encounter Type: Individual

## 2025-07-08 NOTE — PATIENT INSTRUCTIONS
Dexcom G6 Pro Patient Instructions:    1. Plan to wear the Dexcom G6 Pro sensor for 10 days.  It is okay to shower, bathe, and swim.    2. Continue with your usual diabetes care plan - check blood sugars and take medicines, as prescribed.    3. Keep a log of the following things while wearing the sensor:  A. Food/drink: write down what you eat and drink while wearing the sensor, including the amounts  B. Medications: write down the times you take your medications and the dose or units you took  C. Exercise: write down any exercise you do while wearing the sensor.    4. Use a little extra care, especially when getting dressed or exercising, to avoid accidentally loosening or removing the sensor.  And try to avoid laying on the sensor at night.    5. Remove the sensor if you need to have an MRI or full body scans.   Do not throw the sensor away after you remove it.  We can still get the data from the time the sensor was worn.    6. Do not put any sunscreens or lotions on the sensor/transmitter.  Never disconnect the sensor from the transmitter- it will stop the study.    7. If the Dexcom 6 Pro sensor comes off early, place it in a plastic bag or envelope and bring it with you to your follow-up visit.     8. You may remove your sensor on 7/19/25 (Saturday) as it will no longer be recording information after this time.  Just peel it off like a band-aid.  You can use adhesive remover to assist with removal as needed.  Make sure you put the sensor and transmitter in an envelope or bag and bring it with you to the follow-up visit to be downloaded.  We cannot review your information without it.     YOU MUST RETURN THE SENSOR TO BE DOWNLOADED WITHIN 30 DAYS (FROM THE DATE THE SENSOR WAS PLACED). DATA CANNOT BE OBTAINED FROM THE SYSTEM AFTER THIS TIME.      Follow-up appointment: Thursday July 24th 9am.      [place] [facility] for the [facility]:  For Your Diabetes Education and  Nutrition Appointments Call:  [phone]   For Diabetes Education or Nutrition Related Questions:   Phone: [phone]  Send [employer] Message   If you need a medication refill please contact your pharmacy. Please allow 3 business days for your refills to be completed.